# Patient Record
Sex: MALE | Race: WHITE | NOT HISPANIC OR LATINO | Employment: OTHER | URBAN - METROPOLITAN AREA
[De-identification: names, ages, dates, MRNs, and addresses within clinical notes are randomized per-mention and may not be internally consistent; named-entity substitution may affect disease eponyms.]

---

## 2024-08-22 ENCOUNTER — HOSPITAL ENCOUNTER (INPATIENT)
Facility: HOSPITAL | Age: 84
Discharge: HOME HEALTH | DRG: 100 | End: 2024-08-22
Attending: EMERGENCY MEDICINE | Admitting: STUDENT IN AN ORGANIZED HEALTH CARE EDUCATION/TRAINING PROGRAM
Payer: MEDICARE

## 2024-08-22 ENCOUNTER — APPOINTMENT (OUTPATIENT)
Dept: RADIOLOGY | Facility: HOSPITAL | Age: 84
DRG: 100 | End: 2024-08-22
Payer: MEDICARE

## 2024-08-22 ENCOUNTER — APPOINTMENT (OUTPATIENT)
Dept: CARDIOLOGY | Facility: HOSPITAL | Age: 84
DRG: 100 | End: 2024-08-22
Payer: MEDICARE

## 2024-08-22 DIAGNOSIS — G40.909 SEIZURE DISORDER (MULTI): ICD-10-CM

## 2024-08-22 DIAGNOSIS — R41.82 ALTERED MENTAL STATUS, UNSPECIFIED ALTERED MENTAL STATUS TYPE: Primary | ICD-10-CM

## 2024-08-22 DIAGNOSIS — I48.0 PAROXYSMAL ATRIAL FIBRILLATION (MULTI): ICD-10-CM

## 2024-08-22 DIAGNOSIS — E55.9 VITAMIN D DEFICIENCY: ICD-10-CM

## 2024-08-22 DIAGNOSIS — R56.9 SEIZURE (MULTI): ICD-10-CM

## 2024-08-22 DIAGNOSIS — D72.829 LEUKOCYTOSIS, UNSPECIFIED TYPE: ICD-10-CM

## 2024-08-22 DIAGNOSIS — I48.91 ATRIAL FIBRILLATION, UNSPECIFIED TYPE (MULTI): ICD-10-CM

## 2024-08-22 LAB
ALBUMIN SERPL BCP-MCNC: 3.9 G/DL (ref 3.4–5)
ALP SERPL-CCNC: 70 U/L (ref 33–136)
ALT SERPL W P-5'-P-CCNC: 17 U/L (ref 10–52)
AMPHETAMINES UR QL SCN: NORMAL
ANION GAP BLDV CALCULATED.4IONS-SCNC: 17 MMOL/L (ref 10–25)
ANION GAP SERPL CALC-SCNC: 20 MMOL/L (ref 10–20)
APAP SERPL-MCNC: <10 UG/ML
APPEARANCE UR: CLEAR
APTT PPP: 27 SECONDS (ref 27–38)
AST SERPL W P-5'-P-CCNC: 14 U/L (ref 9–39)
BARBITURATES UR QL SCN: NORMAL
BASE EXCESS BLDV CALC-SCNC: -5.9 MMOL/L (ref -2–3)
BASOPHILS # BLD AUTO: 0.07 X10*3/UL (ref 0–0.1)
BASOPHILS # BLD MANUAL: 0 X10*3/UL (ref 0–0.1)
BASOPHILS NFR BLD AUTO: 0.1 %
BASOPHILS NFR BLD MANUAL: 0 %
BENZODIAZ UR QL SCN: NORMAL
BILIRUB SERPL-MCNC: 0.8 MG/DL (ref 0–1.2)
BILIRUB UR STRIP.AUTO-MCNC: NEGATIVE MG/DL
BODY TEMPERATURE: ABNORMAL
BUN SERPL-MCNC: 23 MG/DL (ref 6–23)
BZE UR QL SCN: NORMAL
CA-I BLDV-SCNC: 1.26 MMOL/L (ref 1.1–1.33)
CALCIUM SERPL-MCNC: 9.2 MG/DL (ref 8.6–10.3)
CANNABINOIDS UR QL SCN: NORMAL
CARDIAC TROPONIN I PNL SERPL HS: 5 NG/L (ref 0–20)
CHLORIDE BLDV-SCNC: 102 MMOL/L (ref 98–107)
CHLORIDE SERPL-SCNC: 102 MMOL/L (ref 98–107)
CO2 SERPL-SCNC: 19 MMOL/L (ref 21–32)
COLOR UR: ABNORMAL
CREAT SERPL-MCNC: 1.38 MG/DL (ref 0.5–1.3)
CRP SERPL-MCNC: 0.82 MG/DL
EGFRCR SERPLBLD CKD-EPI 2021: 50 ML/MIN/1.73M*2
EOSINOPHIL # BLD AUTO: 0.22 X10*3/UL (ref 0–0.4)
EOSINOPHIL # BLD MANUAL: 0 X10*3/UL (ref 0–0.4)
EOSINOPHIL NFR BLD AUTO: 0.4 %
EOSINOPHIL NFR BLD MANUAL: 0 %
ERYTHROCYTE [DISTWIDTH] IN BLOOD BY AUTOMATED COUNT: 13.7 % (ref 11.5–14.5)
ERYTHROCYTE [DISTWIDTH] IN BLOOD BY AUTOMATED COUNT: 13.8 % (ref 11.5–14.5)
ERYTHROCYTE [SEDIMENTATION RATE] IN BLOOD BY WESTERGREN METHOD: 3 MM/H (ref 0–20)
ETHANOL SERPL-MCNC: <10 MG/DL
FENTANYL+NORFENTANYL UR QL SCN: NORMAL
GLUCOSE BLD MANUAL STRIP-MCNC: 178 MG/DL (ref 74–99)
GLUCOSE BLDV-MCNC: 200 MG/DL (ref 74–99)
GLUCOSE SERPL-MCNC: 193 MG/DL (ref 74–99)
GLUCOSE UR STRIP.AUTO-MCNC: ABNORMAL MG/DL
HCO3 BLDV-SCNC: 20.2 MMOL/L (ref 22–26)
HCT VFR BLD AUTO: 43.3 % (ref 41–52)
HCT VFR BLD AUTO: 43.6 % (ref 41–52)
HCT VFR BLD EST: 42 % (ref 41–52)
HGB BLD-MCNC: 13.9 G/DL (ref 13.5–17.5)
HGB BLD-MCNC: 14.1 G/DL (ref 13.5–17.5)
HGB BLDV-MCNC: 13.9 G/DL (ref 13.5–17.5)
HOLD SPECIMEN: NORMAL
IMM GRANULOCYTES # BLD AUTO: 0.21 X10*3/UL (ref 0–0.5)
IMM GRANULOCYTES # BLD AUTO: 0.22 X10*3/UL (ref 0–0.5)
IMM GRANULOCYTES NFR BLD AUTO: 0.4 % (ref 0–0.9)
IMM GRANULOCYTES NFR BLD AUTO: 0.5 % (ref 0–0.9)
INHALED O2 CONCENTRATION: 0 %
INR PPP: 1.1 (ref 0.9–1.1)
KETONES UR STRIP.AUTO-MCNC: ABNORMAL MG/DL
LACTATE BLDV-SCNC: 8 MMOL/L (ref 0.4–2)
LACTATE SERPL-SCNC: 3.1 MMOL/L (ref 0.4–2)
LEUKOCYTE ESTERASE UR QL STRIP.AUTO: NEGATIVE
LYMPHOCYTES # BLD AUTO: 49.89 X10*3/UL (ref 0.8–3)
LYMPHOCYTES # BLD MANUAL: 47.97 X10*3/UL (ref 0.8–3)
LYMPHOCYTES NFR BLD AUTO: 87 %
LYMPHOCYTES NFR BLD MANUAL: 78 %
MCH RBC QN AUTO: 29.6 PG (ref 26–34)
MCH RBC QN AUTO: 29.7 PG (ref 26–34)
MCHC RBC AUTO-ENTMCNC: 32.1 G/DL (ref 32–36)
MCHC RBC AUTO-ENTMCNC: 32.3 G/DL (ref 32–36)
MCV RBC AUTO: 92 FL (ref 80–100)
MCV RBC AUTO: 92 FL (ref 80–100)
METHADONE UR QL SCN: NORMAL
MONOCYTES # BLD AUTO: 0.28 X10*3/UL (ref 0.05–0.8)
MONOCYTES # BLD MANUAL: 0.62 X10*3/UL (ref 0.05–0.8)
MONOCYTES NFR BLD AUTO: 0.5 %
MONOCYTES NFR BLD MANUAL: 1 %
MUCOUS THREADS #/AREA URNS AUTO: NORMAL /LPF
NEUTROPHILS # BLD AUTO: 6.67 X10*3/UL (ref 1.6–5.5)
NEUTROPHILS # BLD MANUAL: 12.92 X10*3/UL (ref 1.6–5.5)
NEUTROPHILS NFR BLD AUTO: 11.6 %
NEUTS BAND # BLD MANUAL: 1.85 X10*3/UL (ref 0–0.5)
NEUTS BAND NFR BLD MANUAL: 3 %
NEUTS SEG # BLD MANUAL: 11.07 X10*3/UL (ref 1.6–5)
NEUTS SEG NFR BLD MANUAL: 18 %
NITRITE UR QL STRIP.AUTO: NEGATIVE
NRBC BLD-RTO: 0 /100 WBCS (ref 0–0)
NRBC BLD-RTO: 0 /100 WBCS (ref 0–0)
OPIATES UR QL SCN: NORMAL
OXYCODONE+OXYMORPHONE UR QL SCN: NORMAL
OXYHGB MFR BLDV: 91.5 % (ref 45–75)
PCO2 BLDV: 41 MM HG (ref 41–51)
PCP UR QL SCN: NORMAL
PH BLDV: 7.3 PH (ref 7.33–7.43)
PH UR STRIP.AUTO: 5.5 [PH]
PLATELET # BLD AUTO: 141 X10*3/UL (ref 150–450)
PLATELET # BLD AUTO: 161 X10*3/UL (ref 150–450)
PO2 BLDV: 71 MM HG (ref 35–45)
POTASSIUM BLDV-SCNC: 3.8 MMOL/L (ref 3.5–5.3)
POTASSIUM SERPL-SCNC: 3.8 MMOL/L (ref 3.5–5.3)
PROT SERPL-MCNC: 6.4 G/DL (ref 6.4–8.2)
PROT UR STRIP.AUTO-MCNC: ABNORMAL MG/DL
PROTHROMBIN TIME: 12.4 SECONDS (ref 9.8–12.8)
RBC # BLD AUTO: 4.69 X10*6/UL (ref 4.5–5.9)
RBC # BLD AUTO: 4.74 X10*6/UL (ref 4.5–5.9)
RBC # UR STRIP.AUTO: NEGATIVE /UL
RBC #/AREA URNS AUTO: NORMAL /HPF
RBC MORPH BLD: ABNORMAL
RBC MORPH BLD: NORMAL
SALICYLATES SERPL-MCNC: <3 MG/DL
SAO2 % BLDV: 94 % (ref 45–75)
SODIUM BLDV-SCNC: 135 MMOL/L (ref 136–145)
SODIUM SERPL-SCNC: 137 MMOL/L (ref 136–145)
SP GR UR STRIP.AUTO: 1.04
TOTAL CELLS COUNTED BLD: 100
UROBILINOGEN UR STRIP.AUTO-MCNC: NORMAL MG/DL
WBC # BLD AUTO: 57.4 X10*3/UL (ref 4.4–11.3)
WBC # BLD AUTO: 61.5 X10*3/UL (ref 4.4–11.3)
WBC #/AREA URNS AUTO: NORMAL /HPF

## 2024-08-22 PROCEDURE — 82435 ASSAY OF BLOOD CHLORIDE: CPT

## 2024-08-22 PROCEDURE — 80177 DRUG SCRN QUAN LEVETIRACETAM: CPT | Mod: GEALAB

## 2024-08-22 PROCEDURE — 2500000001 HC RX 250 WO HCPCS SELF ADMINISTERED DRUGS (ALT 637 FOR MEDICARE OP)

## 2024-08-22 PROCEDURE — 70498 CT ANGIOGRAPHY NECK: CPT

## 2024-08-22 PROCEDURE — 2500000004 HC RX 250 GENERAL PHARMACY W/ HCPCS (ALT 636 FOR OP/ED)

## 2024-08-22 PROCEDURE — 1200000002 HC GENERAL ROOM WITH TELEMETRY DAILY

## 2024-08-22 PROCEDURE — 36415 COLL VENOUS BLD VENIPUNCTURE: CPT

## 2024-08-22 PROCEDURE — 70450 CT HEAD/BRAIN W/O DYE: CPT

## 2024-08-22 PROCEDURE — 85610 PROTHROMBIN TIME: CPT

## 2024-08-22 PROCEDURE — 82947 ASSAY GLUCOSE BLOOD QUANT: CPT

## 2024-08-22 PROCEDURE — 72131 CT LUMBAR SPINE W/O DYE: CPT

## 2024-08-22 PROCEDURE — 83605 ASSAY OF LACTIC ACID: CPT | Performed by: EMERGENCY MEDICINE

## 2024-08-22 PROCEDURE — 99285 EMERGENCY DEPT VISIT HI MDM: CPT | Mod: 25

## 2024-08-22 PROCEDURE — 86140 C-REACTIVE PROTEIN: CPT

## 2024-08-22 PROCEDURE — 80179 DRUG ASSAY SALICYLATE: CPT

## 2024-08-22 PROCEDURE — 70498 CT ANGIOGRAPHY NECK: CPT | Performed by: STUDENT IN AN ORGANIZED HEALTH CARE EDUCATION/TRAINING PROGRAM

## 2024-08-22 PROCEDURE — 96365 THER/PROPH/DIAG IV INF INIT: CPT

## 2024-08-22 PROCEDURE — 87040 BLOOD CULTURE FOR BACTERIA: CPT | Mod: GEALAB

## 2024-08-22 PROCEDURE — 84145 PROCALCITONIN (PCT): CPT | Mod: GEALAB

## 2024-08-22 PROCEDURE — 72131 CT LUMBAR SPINE W/O DYE: CPT | Performed by: STUDENT IN AN ORGANIZED HEALTH CARE EDUCATION/TRAINING PROGRAM

## 2024-08-22 PROCEDURE — 87799 DETECT AGENT NOS DNA QUANT: CPT | Mod: GEALAB

## 2024-08-22 PROCEDURE — 74176 CT ABD & PELVIS W/O CONTRAST: CPT

## 2024-08-22 PROCEDURE — 96366 THER/PROPH/DIAG IV INF ADDON: CPT

## 2024-08-22 PROCEDURE — 85027 COMPLETE CBC AUTOMATED: CPT

## 2024-08-22 PROCEDURE — 85025 COMPLETE CBC W/AUTO DIFF WBC: CPT

## 2024-08-22 PROCEDURE — 71045 X-RAY EXAM CHEST 1 VIEW: CPT

## 2024-08-22 PROCEDURE — 96361 HYDRATE IV INFUSION ADD-ON: CPT

## 2024-08-22 PROCEDURE — 86644 CMV ANTIBODY: CPT | Mod: GEALAB

## 2024-08-22 PROCEDURE — 86645 CMV ANTIBODY IGM: CPT

## 2024-08-22 PROCEDURE — 2550000001 HC RX 255 CONTRASTS: Performed by: EMERGENCY MEDICINE

## 2024-08-22 PROCEDURE — 93005 ELECTROCARDIOGRAM TRACING: CPT

## 2024-08-22 PROCEDURE — 70496 CT ANGIOGRAPHY HEAD: CPT | Performed by: STUDENT IN AN ORGANIZED HEALTH CARE EDUCATION/TRAINING PROGRAM

## 2024-08-22 PROCEDURE — 71045 X-RAY EXAM CHEST 1 VIEW: CPT | Performed by: RADIOLOGY

## 2024-08-22 PROCEDURE — 84484 ASSAY OF TROPONIN QUANT: CPT

## 2024-08-22 PROCEDURE — 85652 RBC SED RATE AUTOMATED: CPT

## 2024-08-22 PROCEDURE — 74176 CT ABD & PELVIS W/O CONTRAST: CPT | Performed by: STUDENT IN AN ORGANIZED HEALTH CARE EDUCATION/TRAINING PROGRAM

## 2024-08-22 PROCEDURE — 86788 WEST NILE VIRUS AB IGM: CPT

## 2024-08-22 PROCEDURE — 85007 BL SMEAR W/DIFF WBC COUNT: CPT

## 2024-08-22 PROCEDURE — 86780 TREPONEMA PALLIDUM: CPT | Mod: GEALAB

## 2024-08-22 PROCEDURE — 84075 ASSAY ALKALINE PHOSPHATASE: CPT

## 2024-08-22 PROCEDURE — 81001 URINALYSIS AUTO W/SCOPE: CPT

## 2024-08-22 PROCEDURE — 87476 LYME DIS DNA AMP PROBE: CPT

## 2024-08-22 PROCEDURE — 80307 DRUG TEST PRSMV CHEM ANLYZR: CPT

## 2024-08-22 PROCEDURE — 85730 THROMBOPLASTIN TIME PARTIAL: CPT

## 2024-08-22 PROCEDURE — 70450 CT HEAD/BRAIN W/O DYE: CPT | Performed by: STUDENT IN AN ORGANIZED HEALTH CARE EDUCATION/TRAINING PROGRAM

## 2024-08-22 RX ORDER — ACETAMINOPHEN 650 MG/1
650 SUPPOSITORY RECTAL EVERY 4 HOURS PRN
Status: DISCONTINUED | OUTPATIENT
Start: 2024-08-22 | End: 2024-08-26 | Stop reason: HOSPADM

## 2024-08-22 RX ORDER — POTASSIUM &MAGNESIUM ASPARTATE 250-250 MG
500 CAPSULE ORAL DAILY
COMMUNITY

## 2024-08-22 RX ORDER — LEVETIRACETAM 10 MG/ML
1000 INJECTION INTRAVASCULAR ONCE
Status: COMPLETED | OUTPATIENT
Start: 2024-08-22 | End: 2024-08-22

## 2024-08-22 RX ORDER — METOPROLOL TARTRATE 25 MG/1
25 TABLET, FILM COATED ORAL DAILY
COMMUNITY
Start: 2024-05-19

## 2024-08-22 RX ORDER — ONDANSETRON HYDROCHLORIDE 2 MG/ML
4 INJECTION, SOLUTION INTRAVENOUS EVERY 8 HOURS PRN
Status: DISCONTINUED | OUTPATIENT
Start: 2024-08-22 | End: 2024-08-26 | Stop reason: HOSPADM

## 2024-08-22 RX ORDER — ACETAMINOPHEN 500 MG
5000 TABLET ORAL DAILY
COMMUNITY

## 2024-08-22 RX ORDER — ONDANSETRON 4 MG/1
4 TABLET, FILM COATED ORAL EVERY 8 HOURS PRN
Status: DISCONTINUED | OUTPATIENT
Start: 2024-08-22 | End: 2024-08-26 | Stop reason: HOSPADM

## 2024-08-22 RX ORDER — ACETAMINOPHEN 160 MG/5ML
650 SOLUTION ORAL EVERY 4 HOURS PRN
Status: DISCONTINUED | OUTPATIENT
Start: 2024-08-22 | End: 2024-08-26 | Stop reason: HOSPADM

## 2024-08-22 RX ORDER — WARFARIN 2 MG/1
0.5 TABLET ORAL DAILY
Status: ON HOLD | COMMUNITY
Start: 2024-05-01 | End: 2024-08-23 | Stop reason: ENTERED-IN-ERROR

## 2024-08-22 RX ORDER — VITAMIN B COMPLEX
1 TABLET ORAL DAILY
COMMUNITY

## 2024-08-22 RX ORDER — CITALOPRAM 20 MG/1
1 TABLET, FILM COATED ORAL
COMMUNITY
Start: 2024-05-19

## 2024-08-22 RX ORDER — AMANTADINE HYDROCHLORIDE 100 MG/1
1 CAPSULE, GELATIN COATED ORAL
COMMUNITY
Start: 2024-05-21

## 2024-08-22 RX ORDER — LEVETIRACETAM 500 MG/1
1 TABLET ORAL
Status: ON HOLD | COMMUNITY
Start: 2024-05-19 | End: 2024-08-23

## 2024-08-22 RX ORDER — ACETAMINOPHEN 500 MG
5 TABLET ORAL NIGHTLY
Status: DISCONTINUED | OUTPATIENT
Start: 2024-08-22 | End: 2024-08-26 | Stop reason: HOSPADM

## 2024-08-22 RX ORDER — ACETAMINOPHEN 325 MG/1
650 TABLET ORAL EVERY 4 HOURS PRN
Status: DISCONTINUED | OUTPATIENT
Start: 2024-08-22 | End: 2024-08-26 | Stop reason: HOSPADM

## 2024-08-22 RX ORDER — WARFARIN 6 MG/1
6 TABLET ORAL DAILY
COMMUNITY
Start: 2024-04-10

## 2024-08-22 RX ORDER — OLANZAPINE 10 MG/2ML
2.5 INJECTION, POWDER, FOR SOLUTION INTRAMUSCULAR ONCE
Status: COMPLETED | OUTPATIENT
Start: 2024-08-22 | End: 2024-08-22

## 2024-08-22 RX ORDER — POLYETHYLENE GLYCOL 3350 17 G/17G
17 POWDER, FOR SOLUTION ORAL DAILY
Status: DISCONTINUED | OUTPATIENT
Start: 2024-08-22 | End: 2024-08-26

## 2024-08-22 RX ORDER — PHENYLEPHRINE HCL 10 MG
500 TABLET ORAL DAILY
COMMUNITY

## 2024-08-22 SDOH — SOCIAL STABILITY: SOCIAL INSECURITY: DO YOU FEEL UNSAFE GOING BACK TO THE PLACE WHERE YOU ARE LIVING?: UNABLE TO ASSESS

## 2024-08-22 SDOH — SOCIAL STABILITY: SOCIAL INSECURITY: DO YOU FEEL ANYONE HAS EXPLOITED OR TAKEN ADVANTAGE OF YOU FINANCIALLY OR OF YOUR PERSONAL PROPERTY?: UNABLE TO ASSESS

## 2024-08-22 SDOH — SOCIAL STABILITY: SOCIAL INSECURITY: HAVE YOU HAD ANY THOUGHTS OF HARMING ANYONE ELSE?: UNABLE TO ASSESS

## 2024-08-22 SDOH — SOCIAL STABILITY: SOCIAL INSECURITY: WERE YOU ABLE TO COMPLETE ALL THE BEHAVIORAL HEALTH SCREENINGS?: YES

## 2024-08-22 SDOH — SOCIAL STABILITY: SOCIAL INSECURITY: HAVE YOU HAD THOUGHTS OF HARMING ANYONE ELSE?: UNABLE TO ASSESS

## 2024-08-22 SDOH — SOCIAL STABILITY: SOCIAL INSECURITY: ARE THERE ANY APPARENT SIGNS OF INJURIES/BEHAVIORS THAT COULD BE RELATED TO ABUSE/NEGLECT?: UNABLE TO ASSESS

## 2024-08-22 SDOH — SOCIAL STABILITY: SOCIAL INSECURITY: DOES ANYONE TRY TO KEEP YOU FROM HAVING/CONTACTING OTHER FRIENDS OR DOING THINGS OUTSIDE YOUR HOME?: UNABLE TO ASSESS

## 2024-08-22 SDOH — SOCIAL STABILITY: SOCIAL INSECURITY: HAS ANYONE EVER THREATENED TO HURT YOUR FAMILY OR YOUR PETS?: UNABLE TO ASSESS

## 2024-08-22 SDOH — SOCIAL STABILITY: SOCIAL INSECURITY: ARE YOU OR HAVE YOU BEEN THREATENED OR ABUSED PHYSICALLY, EMOTIONALLY, OR SEXUALLY BY ANYONE?: UNABLE TO ASSESS

## 2024-08-22 SDOH — SOCIAL STABILITY: SOCIAL INSECURITY: ABUSE: ADULT

## 2024-08-22 ASSESSMENT — COGNITIVE AND FUNCTIONAL STATUS - GENERAL
STANDING UP FROM CHAIR USING ARMS: A LOT
DRESSING REGULAR UPPER BODY CLOTHING: A LOT
PERSONAL GROOMING: A LOT
TURNING FROM BACK TO SIDE WHILE IN FLAT BAD: A LOT
WALKING IN HOSPITAL ROOM: TOTAL
DRESSING REGULAR LOWER BODY CLOTHING: A LOT
DAILY ACTIVITIY SCORE: 12
MOBILITY SCORE: 11
PATIENT BASELINE BEDBOUND: UNABLE TO ASSESS AT THIS TIME
MOVING FROM LYING ON BACK TO SITTING ON SIDE OF FLAT BED WITH BEDRAILS: A LITTLE
CLIMB 3 TO 5 STEPS WITH RAILING: TOTAL
EATING MEALS: A LOT
HELP NEEDED FOR BATHING: A LOT
MOVING TO AND FROM BED TO CHAIR: A LOT
TOILETING: A LOT

## 2024-08-22 ASSESSMENT — ENCOUNTER SYMPTOMS
NUMBNESS: 0
MYALGIAS: 0
DIARRHEA: 0
HEMATURIA: 0
UNEXPECTED WEIGHT CHANGE: 0
COUGH: 0
PALPITATIONS: 0
WEAKNESS: 0
CONSTIPATION: 0
SEIZURES: 1
DIZZINESS: 0
NAUSEA: 0
FREQUENCY: 0
SHORTNESS OF BREATH: 0
DIFFICULTY URINATING: 0
ABDOMINAL DISTENTION: 0
FLANK PAIN: 0
HEADACHES: 0
WHEEZING: 0
DIAPHORESIS: 1
FEVER: 1
LIGHT-HEADEDNESS: 0
CHEST TIGHTNESS: 0
DYSURIA: 0
VOMITING: 0

## 2024-08-22 ASSESSMENT — ACTIVITIES OF DAILY LIVING (ADL)
HEARING - LEFT EAR: FUNCTIONAL
JUDGMENT_ADEQUATE_SAFELY_COMPLETE_DAILY_ACTIVITIES: NO
PATIENT'S MEMORY ADEQUATE TO SAFELY COMPLETE DAILY ACTIVITIES?: NO
WALKS IN HOME: DEPENDENT
ADEQUATE_TO_COMPLETE_ADL: YES
GROOMING: DEPENDENT
FEEDING YOURSELF: NEEDS ASSISTANCE
HEARING - RIGHT EAR: FUNCTIONAL
LACK_OF_TRANSPORTATION: NO
DRESSING YOURSELF: DEPENDENT
ASSISTIVE_DEVICE: WHEELCHAIR
TOILETING: DEPENDENT
BATHING: DEPENDENT

## 2024-08-22 ASSESSMENT — PAIN SCALES - PAIN ASSESSMENT IN ADVANCED DEMENTIA (PAINAD)
CONSOLABILITY: NO NEED TO CONSOLE
BREATHING: NORMAL
TOTALSCORE: 0
FACIALEXPRESSION: SMILING OR INEXPRESSIVE
BODYLANGUAGE: RELAXED

## 2024-08-22 ASSESSMENT — COLUMBIA-SUICIDE SEVERITY RATING SCALE - C-SSRS
2. HAVE YOU ACTUALLY HAD ANY THOUGHTS OF KILLING YOURSELF?: NO
1. IN THE PAST MONTH, HAVE YOU WISHED YOU WERE DEAD OR WISHED YOU COULD GO TO SLEEP AND NOT WAKE UP?: NO
6. HAVE YOU EVER DONE ANYTHING, STARTED TO DO ANYTHING, OR PREPARED TO DO ANYTHING TO END YOUR LIFE?: NO

## 2024-08-22 ASSESSMENT — LIFESTYLE VARIABLES
AUDIT-C TOTAL SCORE: 0
HOW MANY STANDARD DRINKS CONTAINING ALCOHOL DO YOU HAVE ON A TYPICAL DAY: PATIENT DOES NOT DRINK
AUDIT-C TOTAL SCORE: 0
SKIP TO QUESTIONS 9-10: 1
HOW OFTEN DO YOU HAVE A DRINK CONTAINING ALCOHOL: NEVER
HOW OFTEN DO YOU HAVE 6 OR MORE DRINKS ON ONE OCCASION: NEVER

## 2024-08-22 ASSESSMENT — PAIN - FUNCTIONAL ASSESSMENT
PAIN_FUNCTIONAL_ASSESSMENT: PAINAD (PAIN ASSESSMENT IN ADVANCED DEMENTIA SCALE)
PAIN_FUNCTIONAL_ASSESSMENT: UNABLE TO SELF-REPORT

## 2024-08-22 ASSESSMENT — PATIENT HEALTH QUESTIONNAIRE - PHQ9
2. FEELING DOWN, DEPRESSED OR HOPELESS: NOT AT ALL
1. LITTLE INTEREST OR PLEASURE IN DOING THINGS: NOT AT ALL
SUM OF ALL RESPONSES TO PHQ9 QUESTIONS 1 & 2: 0

## 2024-08-22 NOTE — ED TRIAGE NOTES
Pt presents via [facility] from home with family. Per EMS pt's family stated that pt was sitting that the table eating when he had 2 episodes of shaking that lasted aprox 10 seconds.

## 2024-08-22 NOTE — PROGRESS NOTES
Pharmacy Medication History Review    Arturo Dale is a 84 y.o. male admitted for Altered mental status, unspecified altered mental status type. Pharmacy reviewed the patient's [id number] medications and allergies for accuracy.    The list below reflectives the updated PTA list. Please review each medication in order reconciliation for additional clarification and justification.  Prior to Admission Medications   Prescriptions Last Dose Informant Patient Reported? Taking?   Cinnamon 500 mg capsule Unknown Family Member, Other Yes See Med Rec      Sig: Take 1 capsule (500 mg) by mouth.   UNABLE TO FIND Unknown Family Member, Other Yes See Med Rec      Sig: Take by mouth. Med Name: Vivarin Caffeine Alertness Aid   amantadine (Symmetrel) 100 mg capsule Unknown Family Member, Other Yes See Med Rec      Sig: Take 1 capsule (100 mg) by mouth every 12 hours.   cholecalciferol (Vitamin D-3) 5,000 Units tablet Unknown Family Member, Other Yes See Med Rec      Sig: Take 1 tablet (5,000 Units) by mouth.   citalopram (CeleXA) 20 mg tablet Unknown Family Member, Other Yes See Med Rec      Sig: Take 1 tablet (20 mg) by mouth early in the morning..   cranberry 500 mg capsule Unknown Family Member, Other Yes See Med Rec      Sig: Take 500 mg by mouth.   cyanocobalamin, vitamin B-12, (Vitamin B-12) 2,500 mcg tablet, sublingual SL tablet Unknown Family Member, Other Yes See Med Rec      Sig: Place 1 tablet (2,500 mcg) under the tongue.   levETIRAcetam (Keppra) 500 mg tablet Unknown Family Member, Other Yes See Med Rec      Sig: Take 1 tablet (500 mg) by mouth every 12 hours.   metoprolol tartrate (Lopressor) 25 mg tablet Unknown Family Member, Other Yes See Med Rec      Sig: Take 1 tablet (25 mg) by mouth once daily. Take with food.   warfarin (Coumadin) 2 mg tablet Unknown Family Member, Other Yes See Med Rec      Sig: Take 0.5 tablets (1 mg) by mouth once daily.   warfarin (Coumadin) 5 mg tablet Unknown Family Member,  Other Yes See Med Rec   Sig: Take 1 tablet (5 mg) by mouth early in the morning..      Facility-Administered Medications: None           The list below reflectives the updated allergy list. Please review each documented allergy for additional clarification and justification.  Allergies  Indicated as Unable to Assess by Marla Hernandez RN on 8/22/2024 (Patient unable to communicate)   Not on File         Below are additional concerns with the patient's PTA list.  Unsure when last doses were.  Sister states his spouse is in hospital and he's been off schedule with medications.  Sister brought in medication bottles.    Neha Moraes

## 2024-08-22 NOTE — PROGRESS NOTES
08/22/24 1522   Discharge Planning   Living Arrangements Spouse/significant other;Family members  (Lives in [place] with spouse. Currently staying with sister in law at [address] , [facility] [address])   Support Systems Spouse/significant other;Family members   Assistance Needed A&OX1 (knows name but location and year answered 72-sister in law bedside stated this is patient's baseline); dependent on assist for ADLs and uses wheelchair-can transfer self at times but mostly assist x 1-2 to chair; doesn't drive; room air baseline -currently 2L NC   Type of Residence Private residence   Number of Stairs to Enter Residence 0  (sister in laws home)   Number of Stairs Within Residence 0  (sister in laws home)   Do you have animals or pets at home? Yes   Type of Animals or Pets 2 dogs & 1 cat   Who is requesting discharge planning? Provider   Home or Post Acute Services None   Expected Discharge Disposition Home  (Pt wheelchair bound and likely dc home to sister in laws home in [place] no needs)   Does the patient need discharge transport arranged? Yes   RoundTrip coordination needed? Yes   Has discharge transport been arranged? No   Financial Resource Strain   How hard is it for you to pay for the very basics like food, housing, medical care, and heating? Not hard   Housing Stability   In the last 12 months, was there a time when you were not able to pay the mortgage or rent on time? N   In the past 12 months, how many times have you moved where you were living? 1   At any time in the past 12 months, were you homeless or living in a shelter (including now)? N   Transportation Needs   In the past 12 months, has lack of transportation kept you from medical appointments or from getting medications? no   In the past 12 months, has lack of transportation kept you from meetings, work, or from getting things needed for daily living? No     08/22/2024 1526pm  Spoke with patient and patient's sister in law bedside in ED. Patient  spouse currently admitted to [address] [place] [address].

## 2024-08-22 NOTE — ED PROVIDER NOTES
History/Exam limitations: mental status.   Additional history was obtained from spouse/SO and EMS personnel.    HPI:    Patient is a 84-year-old male with history of 2 strokes (not ambulatory at baseline but able to bear weight and change positions), on Coumadin, history of seizures on Keppra, hypertension presenting due to concerns of altered mental status and possible seizure at home.  He had a elevated lactate by EMS that was 10.  Patient's sister-in-law was eating breakfast with him and noticed that he started leaning backwards.  He then had a witnessed episode of tonic-clonic movement without fall or trauma.  They then called EMS.  They were told to lay the patient on the floor and were able to do so. Report, patient has been improving in his mentation and now moving all extremities.  He was initially only had sonorous breathing without any spontaneous movement of his extremities.  He has an initial GCS of 9 and withdrawing to pain, spontaneous eye movement that is tracking without any verbal response.  Patient has stable vitals and is  on 2 L of supplemental oxygen.  patient appears to have dried emesis on his lower beard.  He does have a left-sided facial droop.  He is unable to partake in most of the neurologic exam.  Patient was emergently alerted as a stroke and taken to CT.  Glucose initially was 178.      Last Known Well Time: 11:47AM        ------------------------------------------------------------------------------------------------------------------------------------------     Physical Exam:    ED Triage Vitals   Temp Pulse Resp BP   -- -- -- --      SpO2 Temp src Heart Rate Source Patient Position   -- -- -- --      BP Location FiO2 (%)     -- --          Gen: Not in acute distress  Head/Neck: NCAT  Eyes: Anicteric sclerae, noninjected conjunctivae  Nose: No rhinorrhea  Mouth:  MMM  Heart: Regular rate and rhythm w/ no murmurs, rubs, gallops  Lungs: CTAB w/o wheezes, rales, rhonchi, no increased  work of breathing  Abdomen: Soft, NT/ND  Musculoskeletal: No deformities  Extremities: No edema.  Neurologic: Please see below for NIHSS  Skin: No rashes noted  Psychological: Calm        Interval: Baseline  Time: 1:11 PM  Person Administering Scale: Debra Woodruff MD     1a  Level of consciousness: 0=alert; keenly responsive   1b. LOC questions:  2=Performs neither task correctly   1c. LOC commands: 2=Performs neither task correctly   2.  Best Gaze: 0=normal   3. Visual: 0=No visual loss   4. Facial Palsy: 1=Minor paralysis (flattened nasolabial fold, asymmetric on smiling)   5a. Motor left arm: 0=No drift, limb holds 90 (or 45) degrees for full 10 seconds (could not follow commands)   5b.  Motor right arm: 0=No drift, limb holds 90 (or 45) degrees for full 10 seconds (could not follow commands)   6a. motor left le=No drift, limb holds 90 (or 45) degrees for full 10 seconds (could not follow commands)   6b  Motor right le=No drift, limb holds 90 (or 45) degrees for full 10 seconds (could not follow commands)   7. Limb Ataxia: 0=Absent (could not follow commands)   8.  Sensory: 0=Normal; no sensory loss   9. Best Language:  3=Mute, global aphasia; no usable speech or auditory comprehension   10. Dysarthria: 0=Normal   11. Extinction and Inattention: 0=No abnormality (could not follow commands)     Total:   8         VAN: VAN: Positive     ------------------------------------------------------------------------------------------------------------------------------------------     Medical Decision Making:   Patient is a 84-year-old male with history of 2 strokes (not ambulatory at baseline but able to bear weight and change positions), on Coumadin, history of seizures on Keppra, hypertension presenting due to concerns of altered mental status and possible seizure at home.  Differentials considered but not limited to UTI, seizure, medication noncompliance, new stroke, bleed, ACS, pneumonia,  infection.    Based on the patient's history and physical examination broad workup was initiated.  Unknown patient's baseline kidney function however he does have a slight ALDO at 1.38.  Patient has significant demargination with a resultant leukocytosis that is consistent with his reported seizure. Patient's lab work shows elevated leukocytosis to 54 with a lymphocyte and not neutrophil predominance.  Predominance repeat lab work was obtained after fluids and CBC to evaluate if leukocytosis was consistent.  Patient continued to have elevated leukocytosis.  He might be hypercoagulable from underlying undiagnosed leukemia.  Patient was admitted for further workup.  Patient care was overseen by attending physician agrees with plan and disposition.    ED Course as of 08/24/24 1125   Thu Aug 22, 2024   1357 Patient loaded with keppra and is back at baseline [RR]      ED Course User Index  [RR] Debra Woodruff MD         Diagnoses as of 08/24/24 1125   Altered mental status, unspecified altered mental status type   Seizure (Multi)   Leukocytosis, unspecified type        EKG interpreted by myself: Sinus rhythm with a rate of 82.  Right bundle branch block that any acute ST elevations.  T wave inversions noted in lead III.  Noisy baseline.  QTc 495.  No NEREIDA.     Independent Interpretation of Studies: I independently interpreted the CT head and see No obvious evidence of intracranial hemorrhage and No obvious evidence of skull fracture    Chronic Medical Conditions Significantly Affecting Care:  2 strokes (not ambulatory at baseline but able to bear weight and change positions), on Coumadin, history of seizures on Keppra, hypertension    Social Determinants of Health Significantly Affecting Care: Chronic illness in caregivers     External Records Reviewed: I reviewed recent and relevant outside records including: prior records     Discussion of Management with Other Providers:   I discussed the patient/results with:  attending, neurology and the admitting team      IV Thrombolysis IV Thrombolysis Checklist        IV Thrombolysis Given: No; Thrombolysis contraindication reason: Coagulopathy with Platelets <100,000/mm3 OR aPTT >40s OR PT>15s OR INR>1.7          Procedure  Procedures          Debra Woodruff MD  Resident  08/24/24 7812

## 2024-08-22 NOTE — H&P
Internal Medicine - History and Physical Note      Patient: Arturo Dale, Age: 84 y.o., SEX: male , MRN:[id number], ROOM:119/[facility],  Code: Full Code   Admitted On: 8/22/2024   Admitting Dx: Seizure (Multi) [R56.9]  Altered mental status, unspecified altered mental status type [R41.82]  Leukocytosis, unspecified type [D72.829]  PCP: No Assigned PCP Generic MD Angelika        Attending: Mateo August MD        Chief Complaint   Patient: Arturo Dale is a 84 y.o. male who presented to the hospital for   Chief Complaint   Patient presents with    Altered Mental Status       HPI   Arturo Dale is a 84 y.o. year old male  patient with history of 2 strokes, most recent in January 2024, seizure disorder, vascular dementia, COPD, and HTN presenting for seizure. Patient is non-verbal, originally from [place]. His sister-in-law is present on evaluation. Unfortunately his sister-in-law was not aware of much of the patient's history, however she witnessed the seizure occurring this morning after the patient had finished his breakfast. She described his movements as upper and lower extremity jerking motions that lasted for over 5 minutes. She had contacted the paramedics and had laid the patient on the ground. The patient did not fall or injure himself. The sister-in-law reports that he may have had a fever when she went to place him as his forehead was very hot. According to EMS, the patient had a lactate of 10 prior to arrival in the ED. Otherwise the family member does not believe the patient was having any other symptoms including shortness of breath, chest pain, dysuria, nausea, vomiting, abdominal pain, or diarrhea.    ROS  Review of Systems   Constitutional:  Positive for diaphoresis and fever. Negative for unexpected weight change.   Respiratory:  Negative for cough, chest tightness, shortness of breath and wheezing.    Cardiovascular:  Negative for chest pain, palpitations and leg swelling.    Gastrointestinal:  Negative for abdominal distention, constipation, diarrhea, nausea and vomiting.   Genitourinary:  Negative for difficulty urinating, dysuria, flank pain, frequency and hematuria.   Musculoskeletal:  Negative for myalgias.   Neurological:  Positive for seizures. Negative for dizziness, weakness, light-headedness, numbness and headaches.        12 Point ROS negative unless otherwise specified above.     ED COURSE:   VS: /82 , heart rate 83 , respiration 16 , O2 sat 90%, no ED temperature recorded    Labs  - CBC: WBC 57.4 (49.89 Lymphocytes) Hemoglobin 14.1, Plt 161  - BMP:Cr 1.38, BUN 23, Na 137, K 3.8, Glucose 193  - LFTs: ALP 70, ALT 17, AST 14  - Lactate: 3.1  - VBG: pH 7.3, pCO2 41, pO2 71  - UA positive for: trace ketones and glucose  - Blood cultures: Ordered  - Urine cultures: Ordered    Imaging:    CXR: No acute cardiopulmonary process    CT Brain attack head:   1. No acute intracranial hemorrhage or serious brain herniation  2. Right frontal and left temporoparietal CSF density  areas/encephalomalacia likely sequelae of prior infarcts.  3. Moderate cerebral volume loss as well as sequelae of chronic  microvascular ischemia    CT Brain Attack Angio  No hemodynamically significant intracranial or extracranial stenosis,  large vessel occlusion, or aneurysm. Specifically, no evidence of  right-sided occlusion to explain left-sided facial droop.      Scattered non hemodynamically significant atherosclerosis as  described above.      Calcified bilateral pleural plaques likely related to prior asbestos  exposure.  Interventions:   Patient received levetiracetam 2000 mg in the ED and LR 1L bolus    Social History:  - Coming from sister-in-law  - Tobacco: No  - Alcohol: No  - Illicit Drug: No    HealthCare Providers:  - PCP: No Assigned PCP Generic Provider, MD     Code Status: Full Code     Emergency contact: Extended Emergency Contact Information  Primary Emergency Contact:  Patti Dale  Mobile Phone: [phone]  Relation: Spouse  Preferred language: English   needed? No     Past Medical History     Past Medical History:   Diagnosis Date    Stroke (Multi)         Surgical History   History reviewed. No pertinent surgical history.    Family History   No family history on file.    Social History     Social History     Socioeconomic History    Marital status:      Spouse name: Not on file    Number of children: Not on file    Years of education: Not on file    Highest education level: Not on file   Occupational History    Not on file   Tobacco Use    Smoking status: Never    Smokeless tobacco: Never   Vaping Use    Vaping status: Never Used   Substance and Sexual Activity    Alcohol use: Not on file    Drug use: Not on file    Sexual activity: Not on file   Other Topics Concern    Not on file   Social History Narrative    Not on file     Social Determinants of Health     Financial Resource Strain: Low Risk  (8/22/2024)    Overall Financial Resource Strain (CARDIA)     Difficulty of Paying Living Expenses: Not hard at all   Food Insecurity: Not on file   Transportation Needs: No Transportation Needs (8/22/2024)    PRAPARE - Transportation     Lack of Transportation (Medical): No     Lack of Transportation (Non-Medical): No   Physical Activity: Not on file   Stress: Not on file   Social Connections: Not on file   Intimate Partner Violence: Not on file   Housing Stability: Low Risk  (8/22/2024)    Housing Stability Vital Sign     Unable to Pay for Housing in the Last Year: No     Number of Times Moved in the Last Year: 1     Homeless in the Last Year: No       Tobacco Use: Low Risk  (8/22/2024)    Patient History     Smoking Tobacco Use: Never     Smokeless Tobacco Use: Never     Passive Exposure: Not on file        Social History     Substance and Sexual Activity   Alcohol Use None        Allergies   Not on File       Meds    Scheduled medications  lactated  "Ringer's, 1,000 mL, intravenous, Once  melatonin, 5 mg, oral, Nightly  polyethylene glycol, 17 g, oral, Daily      Continuous medications     PRN medications  PRN medications: acetaminophen **OR** acetaminophen **OR** acetaminophen, ondansetron **OR** ondansetron     Objective    Physical Exam  Constitutional:       Appearance: He is obese.   HENT:      Head: Atraumatic.   Cardiovascular:      Rate and Rhythm: Normal rate and regular rhythm.      Pulses: Normal pulses.      Heart sounds: Normal heart sounds. No murmur heard.     No gallop.   Pulmonary:      Effort: Pulmonary effort is normal. No respiratory distress.      Breath sounds: Normal breath sounds. No wheezing or rales.   Abdominal:      General: Abdomen is flat. There is no distension.      Palpations: Abdomen is soft.      Tenderness: There is no abdominal tenderness. There is no guarding or rebound.   Musculoskeletal:      Right lower leg: No edema.      Left lower leg: No edema.   Skin:     Comments: Lumbar spine surgical scar present, slightly warm to the touch and swelling   Neurological:      Comments: Unable to perform full neuro exam due to patient's altered state          Visit Vitals  BP (!) 145/99   Pulse 81   Resp 17   Ht 1.727 m (5' 8\")   Wt 103 kg (227 lb 8.2 oz)   SpO2 (!) 93%   BMI 34.59 kg/m²   Smoking Status Never   BSA 2.22 m²          Intake/Output Summary (Last 24 hours) at 8/22/2024 1849  Last data filed at 8/22/2024 1706  Gross per 24 hour   Intake 1200 ml   Output --   Net 1200 ml             Labs:   Results from last 72 hours   Lab Units 08/22/24  1256   SODIUM mmol/L 137   POTASSIUM mmol/L 3.8   CHLORIDE mmol/L 102   CO2 mmol/L 19*   BUN mg/dL 23   CREATININE mg/dL 1.38*   GLUCOSE mg/dL 193*   CALCIUM mg/dL 9.2   ANION GAP mmol/L 20   EGFR mL/min/1.73m*2 50*      Results from last 72 hours   Lab Units 08/22/24  1702 08/22/24  1256   WBC AUTO x10*3/uL 61.5* 57.4*   HEMOGLOBIN g/dL 13.9 14.1   HEMATOCRIT % 43.3 43.6   PLATELETS " "AUTO x10*3/uL 141* 161   NEUTROS PCT AUTO %  --  11.6   LYMPHO PCT MAN % 78.0  --    LYMPHS PCT AUTO %  --  87.0   MONO PCT MAN % 1.0  --    MONOS PCT AUTO %  --  0.5   EOSINO PCT MAN % 0.0  --    EOS PCT AUTO %  --  0.4      Lab Results   Component Value Date    CALCIUM 9.2 08/22/2024      No results found for: \"CRP\"    [unfilled]     Micro/ID:   No results found for the last 90 days.    No results found for: \"URINECULTURE\", \"BLOODCULT\", \"CSFCULTSMEAR\"                 No lab exists for component: \"AGALPCRNB\"       Images    CT lumbar spine wo IV contrast  Narrative: Interpreted By:  Jennifer Mcodnald,   STUDY:  CT LUMBAR SPINE WO IV CONTRAST  8/22/2024 6:10 pm      INDICATION:  Signs/Symptoms:c/f abscess      COMPARISON:  None.      ACCESSION NUMBER(S):  [id number]      ORDERING CLINICIAN:  ALEXA HERNANDEZ      TECHNIQUE:  Axial CT images of the lumbar spine are obtained. Axial, coronal and  sagittal reconstructions are provided for review.      FINDINGS:  There are 5 lumbar type non rib-bearing vertebral bodies, with the  lowest well-formed intervertebral disc space labeled L5-S1.      Postsurgical changes consistent with discectomies and intervertebral  disc grafts at L3-L4 and L4-L5 with posterior spinal fusion with  pedicle screws and interlocking rods extending from the level of  L3-L4. Associated beam hardening artifact slightly limits evaluation  of the adjacent structures. Within limits of the exam there is no  evidence of perihardware lucency or fracture.      There is a 8-9 mm anterolisthesis of L4 on L5, with bony ankylosis of  L3-L4 and L4-L5. Lumbar vertebral alignment of the levels is  maintained.      Lumbar vertebral body heights are preserved without evidence of  compression fractures.      There is no evidence of acute trauma to the posterior elements of the  lumbar spine. Multilevel degenerate facet osteoarthropathy thrombosis  is present, most pronounced at L4-L5 and L5-S1 on the " right.      Subtle spinal canal stenosis is present, with at least moderate  narrowing suspected at the levels of L2-L3 due to disc osteophyte  complex and ligamentum flavum thickening and L3-L4 due to disc  osteophyte complex and ossification of the posterior longitudinal  ligament.      At least moderate stenosis is also present at the level of L4-L5 due  to spondylolisthesis, hypertrophic facet changes and disc osteophyte  complex.      Multilevel neural foraminal stenosis is present, with moderate to  severe narrowing suspected at the level of L2-L3 due to hypertrophic  facet changes, and at least moderate narrowing present at L3-L4 and  L4-L5.      Prevertebral and paraspinal soft tissues do not demonstrate any acute  abnormality.      Impression: 1.  No evidence of acute process. Paraspinal soft tissues are  unremarkable in appearance without evidence of fluid collections or  soft tissue gas.  2. Degenerate present lumbar spine, with at least moderate stenosis  suspected at the levels of L2-L3, L3-L4 and L4-L5 due to combination  of disc osteophyte complexes, ligamentum flavum thickening,  hypertrophic facet changes and spondylolisthesis at L4-L5.  3. Postsurgical changes of the lumbar spine consistent with  discectomies and intervertebral disc space series L3-L4 and L4-L5 and  posterior spinal fusion with pedicle screws and interlocking rods at  L3-L4.      MACRO:  None      Signed by: Jennifer Mcdonald 8/22/2024 6:29 PM  Dictation workstation:   [id number]  XR chest 1 view  Narrative: Interpreted By:  Vitaliy Dumont,   STUDY:  XR CHEST 1 VIEW;  8/22/2024 5:15 pm      INDICATION:  Signs/Symptoms:eval PNA.      COMPARISON:  None.      ACCESSION NUMBER(S):  [id number]      ORDERING CLINICIAN:  ANNEMARIE TREADWELL      FINDINGS:          CARDIOMEDIASTINAL SILHOUETTE:  Cardiomediastinal silhouette is normal in size and configuration.      LUNGS:  No consolidation, pneumothorax, or significant effusion.  Mild  bilateral reticular changes. Calcified pleural plaque along the right  likely rib likely in prior specific exposure.      ABDOMEN:  No remarkable upper abdominal findings.      BONES:  No acute osseous changes.      Impression: 1.  No evidence of acute cardiopulmonary process.          Signed by: Vitaliy Dumont 8/22/2024 5:19 PM  Dictation workstation:   [id number]  CT brain attack angio head and neck W and WO IV contrast  Narrative: Interpreted By:  Eduar Rosario,   STUDY:  CT BRAIN ATTACK ANGIO HEAD AND NECK W AND WO IV CONTRAST;  8/22/2024  1:08 pm      INDICATION:  Signs/Symptoms:facial droop with aphasia. concern for bleed      COMPARISON:  08/22/2024 CT head without contrast      ACCESSION NUMBER(S):  [id number]      ORDERING CLINICIAN:  DEREK REY      TECHNIQUE:  Following IV contrast administration of iodinated contrast, a CT  angiography of the head and neck was performed. MIPS and 3D  reconstructions of the [place] and neck were created on an  independent workstation and reviewed.      FINDINGS:  There are calcified pleural plaques.      CTA NECK:              LEFT VERTEBRAL ARTERY:  No hemodynamically significant stenosis,  occlusion, or dissection.      LEFT COMMON/INTERNAL CAROTID ARTERY: Minimal calcific atherosclerosis  of the distal CCA and left carotid bulb. No hemodynamically  significant stenosis, occlusion, or dissection.      RIGHT VERTEBRAL ARTERY:  No hemodynamically significant stenosis,  occlusion, or dissection.      RIGHT COMMON/INTERNAL CAROTID ARTERY: Minimal calcific  atherosclerosis of the carotid bulb. No hemodynamically significant  stenosis, occlusion, or dissection.          The neck soft tissues show no evidence of mass, fluid collection, or  enlarged lymph nodes. Multinodular goiter.  There is no acute osseous abnormality.  Multilevel degenerative disc disease with disc spur complexes  resulting in moderate C3-C4 canal stenosis, mild to moderate  C5-C6  canal stenosis and mild C6-7 canal stenosis. Multilevel osteophytic  neural foraminal stenosis notable for moderate-severe right C2-C3  foraminal stenosis, severe right C3-C4 foraminal stenosis, moderate  severe left C4-C5 foraminal stenosis, severe right and moderate left  C5-C6 foraminal stenosis and moderate C6-C7 foraminal silhouette              CTA HEAD:      ANTERIOR CIRCULATION: No aneurysm.      - Internal Carotid Arteries: Mild calcific atherosclerosis  bilaterally. No hemodynamically significant stenosis or occlusion.      - Middle Cerebral Arteries: There is a paucity of left M4 vessels in  the region the chronic infarct of the inferior MCA division;  otherwise, no hemodynamically significant stenosis or occlusion.      - Anterior Cerebral Arteries:  No hemodynamically significant  stenosis or occlusion.          POSTERIOR CIRCULATION: No aneurysm.      - Intracranial Vertebral Arteries: There is calcific atherosclerosis  of the bilateral intracranial vertebral arteries without significant  stenosis.      - Basilar Artery:  No hemodynamically significant stenosis or  occlusion.      - Posterior Cerebral Arteries: There is fetal origin of the left  posterior cerebral artery. No hemodynamically significant stenosis or  occlusion.      No arteriovenous malformation is visualized.  No pathologic intracranial enhancement or discrete mass.  The dural venous sinuses are patent.      MIPS and 3D reconstructions confirm the above findings.      Impression: No hemodynamically significant intracranial or extracranial stenosis,  large vessel occlusion, or aneurysm. Specifically, no evidence of  right-sided occlusion to explain left-sided facial droop.      Scattered non hemodynamically significant atherosclerosis as  described above.      Calcified bilateral pleural plaques likely related to prior asbestos  exposure.      MACRO:  None.      Signed by: Eduar Rosario 8/22/2024 1:34 PM  Dictation workstation:    [id number]  CT brain attack head wo IV contrast  Narrative: Interpreted By:  Dane Perkins,   STUDY:  CT BRAIN ATTACK HEAD WO IV CONTRAST;  8/22/2024 1:04 pm      INDICATION:  Signs/Symptoms:Stroke Evaluation.      COMPARISON:  None.      ACCESSION NUMBER(S):  [id number]      ORDERING CLINICIAN:  DEREK REY      TECHNIQUE:  Noncontrast axial CT scan of head was performed. Angled reformats in  brain and bone windows were generated. The images were reviewed in  bone, brain, blood and soft tissue windows.      FINDINGS:  CSF Spaces: The ventricles, sulci and basal cisterns are prominent.  There is no extraaxial fluid collection.      Parenchyma: Moderate cerebral volume loss. Right frontal and left  temporoparietal CSF density areas likely related to prior infarcts.  Bilateral periventricular white matter ill-defined hypodensities  likely sequelae of chronic microvascular ischemia. No serious brain  herniation or shift of midline structures. No major intracranial  hemorrhage.      Calvarium: The calvarium is unremarkable.      Paranasal sinuses and mastoids: Visualized paranasal sinuses and  mastoids are clear.      Impression: 1. No acute intracranial hemorrhage or serious brain herniation  2. Right frontal and left temporoparietal CSF density  areas/encephalomalacia likely sequelae of prior infarcts.  3. Moderate cerebral volume loss as well as sequelae of chronic  microvascular ischemia.      If continued clinical concern for acute neurological deficit, advise  further assessment by dedicated brain MRI.      MACRO:  None      Signed by: Dane Perkins 8/22/2024 1:14 PM  Dictation workstation:   [id number]       No results found for this or any previous visit (from the past 4464 hour(s)).   No results found for this or any previous visit (from the past 4464 hour(s)).     [unfilled]     [unfilled]     Assessment and Plan    Arturo Dale is a 84 y.o. male admitted on 8/22/2024 with history of 2 strokes,  most recent in January 2024, seizure disorder, leukemia, vascular dementia, HLD, and HTN presenting for seizure.    ACUTE MEDICAL ISSUES:  #Acute Metabolic Encephalopathy in the setting of Seizure Disorder  #Chronic history of Leukemia, Immunocompromised  #Rule out Viral/Bacterial Meningitis  #Rule out Infected Spinal Hardware  -Significantly elevated leukocytosis increased from 57.4 -->61.5, lymphocyte predominance but also includes elevated segmental and banded neutrophils.   -Thrombocytopenia with decreased platelets from 161--> 141  -Reported history of CLL according to sister-in law  -CMV and EBV encephalopathy cannot be ruled out, they can all trigger a seizure disorder.  -Lumbar surgical scar was noted on physical exam with increased warmth on palpation  -Head, neck, and brain CT unimpressive for acute intracranial cause  -CXR unremarkable for acute process.  -UA unremarkable  -Vitals stable on admission  Plan:  -Inflammatory Markers   -EBV PCR, CMV IgG and IgM   -Lyme Disease PCR  -[place] antibodies igG and IgM  -Procalcitonin   -CT Abd/Pelvis to rule out abdominal abcess  -CT Lumbar Spine for lumbar swelling  -Possible Lumbar Puncture   -Possible Hemoglobin Electrophoresis  -Blood Culture pending  -Neurology Consult  -No antibiotics at this time  -Continue home Keppra    #ALDO, likely prerenal  -Creatinine of 1.38 on admission  -Received 1L of LR bolus  Plan:  -additional 1L LR bolus  -Morning labs  -Monitor I + O    CHRONIC MEDICAL ISSUES:  #HLD- Continue home Lipitor  #HTN- Continue home metoprolol  #History of CVA- Continue home Warfarin    Fluids: Received 2L LR bolus total  Electrolytes: PRN  Nutrition: NPO, pending bedside swallow  Antimicrobials: None currently  Lines: PIV  Supplemental Oxygen: RA  Emergency Contact: Extended Emergency Contact Information  Primary Emergency Contact: Patti Dale  Mobile Phone: [phone]  Relation: Spouse  Preferred language: English   needed?  No   Code: Full Code     Disposition: ELOS>48 hours    Efrain Parker DO  Internal Medicine, PGY- 1  08/22/24 at 6:49 PM

## 2024-08-23 ENCOUNTER — HOSPITAL ENCOUNTER (INPATIENT)
Dept: NEUROLOGY | Facility: HOSPITAL | Age: 84
Discharge: HOME | DRG: 100 | End: 2024-08-23
Payer: MEDICARE

## 2024-08-23 ENCOUNTER — PHARMACY VISIT (OUTPATIENT)
Dept: PHARMACY | Facility: CLINIC | Age: 84
End: 2024-08-23

## 2024-08-23 PROBLEM — F32.5 MAJOR DEPRESSION IN REMISSION (CMS-HCC): Status: ACTIVE | Noted: 2024-08-23

## 2024-08-23 PROBLEM — N52.03 COMBINED ARTERIAL INSUFFICIENCY AND CORPORO-VENOUS OCCLUSIVE ERECTILE DYSFUNCTION: Status: ACTIVE | Noted: 2024-08-23

## 2024-08-23 PROBLEM — E55.9 VITAMIN D DEFICIENCY: Status: ACTIVE | Noted: 2024-08-23

## 2024-08-23 PROBLEM — C91.10 CHRONIC LYMPHOCYTIC LEUKEMIA (MULTI): Status: ACTIVE | Noted: 2024-08-23

## 2024-08-23 PROBLEM — I69.320 APHASIA AS LATE EFFECT OF CEREBROVASCULAR ACCIDENT: Status: ACTIVE | Noted: 2024-08-23

## 2024-08-23 PROBLEM — R41.82 ALTERED MENTAL STATUS, UNSPECIFIED ALTERED MENTAL STATUS TYPE: Status: RESOLVED | Noted: 2024-08-22 | Resolved: 2024-08-23

## 2024-08-23 PROBLEM — D68.59 HYPERCOAGULABLE STATE (MULTI): Status: ACTIVE | Noted: 2024-08-23

## 2024-08-23 PROBLEM — N40.1 LOWER URINARY TRACT SYMPTOMS DUE TO BENIGN PROSTATIC HYPERPLASIA: Status: ACTIVE | Noted: 2024-08-23

## 2024-08-23 PROBLEM — E29.1 TESTICULAR HYPOFUNCTION: Status: ACTIVE | Noted: 2024-08-23

## 2024-08-23 PROBLEM — G40.909 SEIZURE DISORDER (MULTI): Status: ACTIVE | Noted: 2024-08-23

## 2024-08-23 PROBLEM — E11.69 TYPE 2 DIABETES MELLITUS WITH OTHER SPECIFIED COMPLICATION (MULTI): Status: ACTIVE | Noted: 2024-08-23

## 2024-08-23 PROBLEM — J61 ASBESTOSIS (MULTI): Status: ACTIVE | Noted: 2024-08-23

## 2024-08-23 PROBLEM — I48.0 PAROXYSMAL ATRIAL FIBRILLATION (MULTI): Status: ACTIVE | Noted: 2024-08-23

## 2024-08-23 PROBLEM — D84.9 IMMUNOLOGIC DEFICIENCY SYNDROME (MULTI): Status: ACTIVE | Noted: 2024-08-23

## 2024-08-23 PROBLEM — N18.32 STAGE 3B CHRONIC KIDNEY DISEASE (MULTI): Status: ACTIVE | Noted: 2024-08-23

## 2024-08-23 PROBLEM — F03.918 DEMENTIA WITH BEHAVIORAL DISTURBANCE (MULTI): Status: ACTIVE | Noted: 2024-08-23

## 2024-08-23 PROBLEM — R35.0 URINARY FREQUENCY: Status: ACTIVE | Noted: 2024-08-23

## 2024-08-23 PROBLEM — R33.9 RETENTION OF URINE: Status: ACTIVE | Noted: 2024-08-23

## 2024-08-23 PROBLEM — I10 HYPERTENSION: Status: ACTIVE | Noted: 2024-08-23

## 2024-08-23 PROBLEM — I69.959 HEMIPLEGIA OF DOMINANT SIDE AS LATE EFFECT FOLLOWING CEREBROVASCULAR DISEASE (MULTI): Status: ACTIVE | Noted: 2024-08-23

## 2024-08-23 PROBLEM — I27.20 PULMONARY HYPERTENSION (MULTI): Status: ACTIVE | Noted: 2024-08-23

## 2024-08-23 PROBLEM — G89.29 CHRONIC PAIN: Status: ACTIVE | Noted: 2024-08-23

## 2024-08-23 PROBLEM — E78.2 MIXED HYPERLIPIDEMIA: Status: ACTIVE | Noted: 2024-08-23

## 2024-08-23 LAB
ALBUMIN SERPL BCP-MCNC: 3.7 G/DL (ref 3.4–5)
ANION GAP SERPL CALC-SCNC: 11 MMOL/L (ref 10–20)
ATRIAL RATE: 82 BPM
BASOPHILS # BLD AUTO: 0.04 X10*3/UL (ref 0–0.1)
BASOPHILS NFR BLD AUTO: 0.1 %
BUN SERPL-MCNC: 19 MG/DL (ref 6–23)
CALCIUM SERPL-MCNC: 9.2 MG/DL (ref 8.6–10.3)
CHLORIDE SERPL-SCNC: 104 MMOL/L (ref 98–107)
CMV IGG AVIDITY SERPL IA-RTO: NONREACTIVE %
CO2 SERPL-SCNC: 27 MMOL/L (ref 21–32)
CREAT SERPL-MCNC: 1.18 MG/DL (ref 0.5–1.3)
EBV DNA SPEC NAA+PROBE-LOG#: NORMAL {LOG_COPIES}/ML
EGFRCR SERPLBLD CKD-EPI 2021: 60 ML/MIN/1.73M*2
EOSINOPHIL # BLD AUTO: 0.14 X10*3/UL (ref 0–0.4)
EOSINOPHIL NFR BLD AUTO: 0.3 %
ERYTHROCYTE [DISTWIDTH] IN BLOOD BY AUTOMATED COUNT: 13.8 % (ref 11.5–14.5)
GLUCOSE BLD MANUAL STRIP-MCNC: 93 MG/DL (ref 74–99)
GLUCOSE SERPL-MCNC: 92 MG/DL (ref 74–99)
HCT VFR BLD AUTO: 42.2 % (ref 41–52)
HGB BLD-MCNC: 13.4 G/DL (ref 13.5–17.5)
IMM GRANULOCYTES # BLD AUTO: 0.12 X10*3/UL (ref 0–0.5)
IMM GRANULOCYTES NFR BLD AUTO: 0.3 % (ref 0–0.9)
INR PPP: 1.1 (ref 0.9–1.1)
LABORATORY COMMENT REPORT: NOT DETECTED
LEVETIRACETAM SERPL-MCNC: 18 UG/ML (ref 10–40)
LEVETIRACETAM SERPL-MCNC: 64 UG/ML (ref 10–40)
LYMPHOCYTES # BLD AUTO: 38.9 X10*3/UL (ref 0.8–3)
LYMPHOCYTES NFR BLD AUTO: 85.8 %
MAGNESIUM SERPL-MCNC: 2 MG/DL (ref 1.6–2.4)
MCH RBC QN AUTO: 29.5 PG (ref 26–34)
MCHC RBC AUTO-ENTMCNC: 31.8 G/DL (ref 32–36)
MCV RBC AUTO: 93 FL (ref 80–100)
MONOCYTES # BLD AUTO: 0.74 X10*3/UL (ref 0.05–0.8)
MONOCYTES NFR BLD AUTO: 1.6 %
NEUTROPHILS # BLD AUTO: 5.41 X10*3/UL (ref 1.6–5.5)
NEUTROPHILS NFR BLD AUTO: 11.9 %
NRBC BLD-RTO: 0 /100 WBCS (ref 0–0)
P AXIS: 48 DEGREES
P OFFSET: 164 MS
P ONSET: 105 MS
PATH REVIEW-CBC DIFFERENTIAL: NORMAL
PHOSPHATE SERPL-MCNC: 3.2 MG/DL (ref 2.5–4.9)
PLATELET # BLD AUTO: 154 X10*3/UL (ref 150–450)
POTASSIUM SERPL-SCNC: 3.8 MMOL/L (ref 3.5–5.3)
PR INTERVAL: 208 MS
PROCALCITONIN SERPL-MCNC: 0.05 NG/ML
PROTHROMBIN TIME: 12.1 SECONDS (ref 9.8–12.8)
Q ONSET: 209 MS
QRS COUNT: 14 BEATS
QRS DURATION: 148 MS
QT INTERVAL: 424 MS
QTC CALCULATION(BAZETT): 495 MS
QTC FREDERICIA: 470 MS
R AXIS: -65 DEGREES
RBC # BLD AUTO: 4.54 X10*6/UL (ref 4.5–5.9)
RBC MORPH BLD: NORMAL
SODIUM SERPL-SCNC: 138 MMOL/L (ref 136–145)
T AXIS: 21 DEGREES
T OFFSET: 421 MS
TREPONEMA PALLIDUM IGG+IGM AB [PRESENCE] IN SERUM OR PLASMA BY IMMUNOASSAY: NONREACTIVE
VENTRICULAR RATE: 82 BPM
WBC # BLD AUTO: 45.4 X10*3/UL (ref 4.4–11.3)

## 2024-08-23 PROCEDURE — 82947 ASSAY GLUCOSE BLOOD QUANT: CPT

## 2024-08-23 PROCEDURE — 99223 1ST HOSP IP/OBS HIGH 75: CPT

## 2024-08-23 PROCEDURE — 1200000002 HC GENERAL ROOM WITH TELEMETRY DAILY

## 2024-08-23 PROCEDURE — 2500000001 HC RX 250 WO HCPCS SELF ADMINISTERED DRUGS (ALT 637 FOR MEDICARE OP)

## 2024-08-23 PROCEDURE — 85610 PROTHROMBIN TIME: CPT

## 2024-08-23 PROCEDURE — 80069 RENAL FUNCTION PANEL: CPT

## 2024-08-23 PROCEDURE — 95819 EEG AWAKE AND ASLEEP: CPT | Performed by: PSYCHIATRY & NEUROLOGY

## 2024-08-23 PROCEDURE — 2500000002 HC RX 250 W HCPCS SELF ADMINISTERED DRUGS (ALT 637 FOR MEDICARE OP, ALT 636 FOR OP/ED)

## 2024-08-23 PROCEDURE — RXMED WILLOW AMBULATORY MEDICATION CHARGE

## 2024-08-23 PROCEDURE — 80177 DRUG SCRN QUAN LEVETIRACETAM: CPT | Mod: GEALAB

## 2024-08-23 PROCEDURE — 95819 EEG AWAKE AND ASLEEP: CPT

## 2024-08-23 PROCEDURE — 2500000001 HC RX 250 WO HCPCS SELF ADMINISTERED DRUGS (ALT 637 FOR MEDICARE OP): Performed by: STUDENT IN AN ORGANIZED HEALTH CARE EDUCATION/TRAINING PROGRAM

## 2024-08-23 PROCEDURE — 36415 COLL VENOUS BLD VENIPUNCTURE: CPT

## 2024-08-23 PROCEDURE — 83735 ASSAY OF MAGNESIUM: CPT

## 2024-08-23 PROCEDURE — 85025 COMPLETE CBC W/AUTO DIFF WBC: CPT

## 2024-08-23 RX ORDER — MAGNESIUM HYDROXIDE 2400 MG/10ML
10 SUSPENSION ORAL 2 TIMES DAILY PRN
Status: DISCONTINUED | OUTPATIENT
Start: 2024-08-23 | End: 2024-08-26 | Stop reason: HOSPADM

## 2024-08-23 RX ORDER — LACOSAMIDE 100 MG/1
50 TABLET ORAL 2 TIMES DAILY
Status: DISCONTINUED | OUTPATIENT
Start: 2024-08-23 | End: 2024-08-23

## 2024-08-23 RX ORDER — METOPROLOL TARTRATE 25 MG/1
25 TABLET, FILM COATED ORAL DAILY
Status: DISCONTINUED | OUTPATIENT
Start: 2024-08-23 | End: 2024-08-26 | Stop reason: HOSPADM

## 2024-08-23 RX ORDER — LEVETIRACETAM 500 MG/1
500 TABLET ORAL
Status: DISCONTINUED | OUTPATIENT
Start: 2024-08-23 | End: 2024-08-23

## 2024-08-23 RX ORDER — LEVETIRACETAM 500 MG/1
1000 TABLET ORAL
Qty: 120 TABLET | Refills: 2 | Status: SHIPPED | OUTPATIENT
Start: 2024-08-23 | End: 2024-11-21

## 2024-08-23 RX ORDER — AMANTADINE HYDROCHLORIDE 100 MG/1
100 CAPSULE, GELATIN COATED ORAL
Status: DISCONTINUED | OUTPATIENT
Start: 2024-08-23 | End: 2024-08-26 | Stop reason: HOSPADM

## 2024-08-23 RX ORDER — AMOXICILLIN 250 MG
2 CAPSULE ORAL 2 TIMES DAILY
Status: DISCONTINUED | OUTPATIENT
Start: 2024-08-23 | End: 2024-08-26 | Stop reason: HOSPADM

## 2024-08-23 RX ORDER — LACOSAMIDE 50 MG/1
50 TABLET ORAL 2 TIMES DAILY
Qty: 60 TABLET | Refills: 5 | Status: SHIPPED | OUTPATIENT
Start: 2024-08-23 | End: 2024-08-23 | Stop reason: HOSPADM

## 2024-08-23 RX ORDER — POLYETHYLENE GLYCOL 3350 17 G/17G
17 POWDER, FOR SOLUTION ORAL DAILY
Status: DISCONTINUED | OUTPATIENT
Start: 2024-08-23 | End: 2024-08-26 | Stop reason: HOSPADM

## 2024-08-23 RX ORDER — LEVETIRACETAM 500 MG/1
1000 TABLET ORAL
Status: DISCONTINUED | OUTPATIENT
Start: 2024-08-23 | End: 2024-08-26 | Stop reason: HOSPADM

## 2024-08-23 RX ORDER — WARFARIN 3 MG/1
6 TABLET ORAL DAILY
Status: DISCONTINUED | OUTPATIENT
Start: 2024-08-23 | End: 2024-08-25

## 2024-08-23 RX ORDER — CITALOPRAM 20 MG/1
20 TABLET, FILM COATED ORAL
Status: DISCONTINUED | OUTPATIENT
Start: 2024-08-23 | End: 2024-08-26 | Stop reason: HOSPADM

## 2024-08-23 ASSESSMENT — COGNITIVE AND FUNCTIONAL STATUS - GENERAL
CLIMB 3 TO 5 STEPS WITH RAILING: TOTAL
STANDING UP FROM CHAIR USING ARMS: A LOT
STANDING UP FROM CHAIR USING ARMS: A LOT
CLIMB 3 TO 5 STEPS WITH RAILING: TOTAL
DAILY ACTIVITIY SCORE: 12
MOVING FROM LYING ON BACK TO SITTING ON SIDE OF FLAT BED WITH BEDRAILS: A LITTLE
DAILY ACTIVITIY SCORE: 12
MOVING TO AND FROM BED TO CHAIR: A LOT
HELP NEEDED FOR BATHING: A LOT
EATING MEALS: A LOT
MOBILITY SCORE: 11
DRESSING REGULAR UPPER BODY CLOTHING: A LOT
MOVING TO AND FROM BED TO CHAIR: A LOT
MOBILITY SCORE: 11
DRESSING REGULAR LOWER BODY CLOTHING: A LOT
TOILETING: A LOT
PERSONAL GROOMING: A LOT
WALKING IN HOSPITAL ROOM: TOTAL
TURNING FROM BACK TO SIDE WHILE IN FLAT BAD: A LOT
PERSONAL GROOMING: A LOT
HELP NEEDED FOR BATHING: A LOT
DRESSING REGULAR LOWER BODY CLOTHING: A LOT
MOVING FROM LYING ON BACK TO SITTING ON SIDE OF FLAT BED WITH BEDRAILS: A LITTLE
TURNING FROM BACK TO SIDE WHILE IN FLAT BAD: A LOT
TOILETING: A LOT
EATING MEALS: A LOT
WALKING IN HOSPITAL ROOM: TOTAL
DRESSING REGULAR UPPER BODY CLOTHING: A LOT

## 2024-08-23 ASSESSMENT — PAIN SCALES - GENERAL
PAINLEVEL_OUTOF10: 0 - NO PAIN
PAINLEVEL_OUTOF10: 0 - NO PAIN

## 2024-08-23 ASSESSMENT — PAIN SCALES - PAIN ASSESSMENT IN ADVANCED DEMENTIA (PAINAD)
CONSOLABILITY: NO NEED TO CONSOLE
FACIALEXPRESSION: SMILING OR INEXPRESSIVE
TOTALSCORE: 0
BODYLANGUAGE: RELAXED
BREATHING: NORMAL

## 2024-08-23 ASSESSMENT — PAIN - FUNCTIONAL ASSESSMENT: PAIN_FUNCTIONAL_ASSESSMENT: PAINAD (PAIN ASSESSMENT IN ADVANCED DEMENTIA SCALE)

## 2024-08-23 NOTE — DISCHARGE SUMMARY
Discharge Diagnosis  Altered mental status, unspecified altered mental status type    Issues Requiring Follow-Up  Breakthrough Seizure    Discharge Meds     Your medication list        CHANGE how you take these medications        Instructions Last Dose Given Next Dose Due   levETIRAcetam 500 mg tablet  Commonly known as: Keppra  What changed: how much to take      Take 2 tablets (1,000 mg) by mouth every 12 hours.              CONTINUE taking these medications        Instructions Last Dose Given Next Dose Due   amantadine 100 mg capsule  Commonly known as: Symmetrel           cholecalciferol 5,000 Units tablet  Commonly known as: Vitamin D-3           cinnamon 500 mg capsule           citalopram 20 mg tablet  Commonly known as: CeleXA           cranberry 500 mg capsule           metoprolol tartrate 25 mg tablet  Commonly known as: Lopressor           Vitamin B-12 2,500 mcg tablet, sublingual SL tablet  Generic drug: cyanocobalamin (vitamin B-12)           warfarin 6 mg tablet  Commonly known as: Coumadin                  STOP taking these medications      UNABLE TO FIND                  Where to Get Your Medications        These medications were sent to [facility]  [id number] , [facility] [address]      Hours: 9 AM to 5 PM Mon-Fri Phone: [phone]   levETIRAcetam 500 mg tablet         Test Results Pending At Discharge  Pending Labs       Order Current Status    Blood Gas Lactic Acid, Venous In process    CMV IgG, IgM In process    Cytomegalovirus Antibody, IgM In process    Lyme disease, PCR In process    West Nile antibodies, IgG and IgM In process    Blood Culture Preliminary result    Blood Culture Preliminary result        KATHI Dale is a 84 y.o. year old male  patient with history of 2 strokes, most recent in January 2024, seizure disorder, CVA, afib with RVR, vascular dementia, COPD, and HTN presenting for seizure. Patient is non-verbal, originally from [place]. His  sister-in-law is present on evaluation. Unfortunately his sister-in-law was not aware of much of the patient's history, however she witnessed the seizure occurring this morning after the patient had finished his breakfast. She described his movements as upper and lower extremity jerking motions that lasted for over 5 minutes. She had contacted the paramedics and had laid the patient on the ground. The patient did not fall or injure himself. The sister-in-law reports that he may have had a fever when she went to place him as his forehead was very hot. According to EMS, the patient had a lactate of 10 prior to arrival in the ED. Otherwise the family member does not believe the patient was having any other symptoms including shortness of breath, chest pain, dysuria, nausea, vomiting, abdominal pain, or diarrhea.     ED Course  Patient was significantly altered on presentation to the ED. Per ED notes, patient was withdrawing from pain, had spontaneous eye movement, and was tracking without verbal response. Vitals at the time of evaluation were unremarkable. Urinalysis was positive for trace ketones and glucose. Labs were significant for profound leukocytosis of 57.4, lymphocyte predominant and creatinine of 1.38. The patient had a history of Chronic Lymphocytic Leukemia, which was consistent with his elevated leukocytes. The patient received 2000 mg of Levetiracetam in the ED and 1 liter lactated ringer bolus. Unfortunately keppra levels were taken at the same time as he received the keppra infusion in the ED, which led to a falsely elevated level. The patient's lactate improved to 3.1. On physical exam there appeared to be a surgical scar on the patient's lower lumbar spine with mildly increased temperature on palpation and mild swelling. However, he underwent CXR and multiple CT imaging for his lumbar spine, brain, head, neck, and abdomen which were unremarkable for acute processes. The patient was then transferred to  the general floor.    General Floor  On the general floor, the patient's home medication was continued, including his home dose of Keppra. The patient became more alert on the general floor. He was placed on telemetry, however unfortunately the patient kept repeatedly removing the telemetry leads. Furthermore, while the patient had passed his bedside swallow evaluation and was given a diet, he kept rejecting meals. The patient received one dose of Zyprexa over night for sedation. On the day of discharge, the patient underwent an EEG. He was more alert and oriented x 1. The patient was more talkative and was able to answer some questions, however he was unable to follow commands. Per the patient's wife, this appears to be the patient's baseline. His wbc count decreased to 45.4 and appears to be chronic due to the patient's history of Leukemia. His creatinine improved to 1.18 and the remainder of the patient's labs were unremarkable. The patient's blood smear was significant for smudge cells. Furthermore, the patients inflammatory markers were negative. INR was decreased from expected during hospital stay, patient was initiated on Enoxaparin every 12 hours in addition to his home warfarin dose. PT/OT recommended moderate intensity level of care upon discharge.     Discharge Instructions  Please, take your home medications as instructed after being discharged from the hospital.     NEW MEDICATIONS:  Increase your Keppra dose to 1000mg (two tablets) twice a day     UPCOMING APPOINTMENTS:    Please, follow-up with your PCP within 7 to 14 days after leaving the hospital, please follow-up with Neurology  within one month of discharge from the [facility] [place]./appointment services has been requested to make an appointment for you, however if you do not hear back from them within 1 to 2 days, please call your primary physician's office to schedule an appointment. Bring your photo ID and insurance card to your appointment.    [facility] services can be reached at [phone].     If you experience any worsening symptoms or have any concerns, please contact your primary care provider to schedule an appointment. If you cannot get in touch with your primary care physician, please return to the nearest emergency room or urgent care clinic for an evaluation and treatment.     Thank you for choosing [facility] and allowing us to partake in your medical care!     - Your [facility] inpatient primary care team.    Pertinent Physical Exam At Time of Discharge  Physical Exam  Constitutional:       General: He is not in acute distress.     Appearance: He is not ill-appearing.      Comments: Alert and Oriented x 1, patient was able to speak, but does not respond to questions. Seems to be his baseline   Eyes:      General: No scleral icterus.        Right eye: No discharge.         Left eye: No discharge.      Extraocular Movements: Extraocular movements intact.      Conjunctiva/sclera: Conjunctivae normal.   Cardiovascular:      Rate and Rhythm: Normal rate and regular rhythm.      Pulses: Normal pulses.      Heart sounds: Normal heart sounds. No murmur heard.     No gallop.   Pulmonary:      Effort: Pulmonary effort is normal. No respiratory distress.      Breath sounds: Normal breath sounds. No wheezing or rales.   Abdominal:      General: Abdomen is flat. Bowel sounds are normal. There is no distension.      Palpations: Abdomen is soft.      Tenderness: There is no abdominal tenderness. There is no guarding or rebound.   Musculoskeletal:         General: No swelling or tenderness.   Neurological:      Mental Status: Mental status is at baseline.      Comments: Unable to perform full neuro exam, patient appears to be at baseline         Outpatient Follow-Up  No future appointments.      Efrain Parker, DO

## 2024-08-23 NOTE — CARE PLAN
The patient's goals for the shift include  be free from injury.    The clinical goals for the shift include Patient will remain free from falls.    Over the shift, the patient did not make progress toward the following goals. Barriers to progression include acute illness. Recommendations to address these barriers include medications/PT/OT.

## 2024-08-23 NOTE — PROGRESS NOTES
Occupational Therapy                 Therapy Communication Note    Patient Name: Arturo Dale  MRN: [id number]  Today's Date: 8/23/2024     Discipline: Occupational Therapy    Missed Visit Reason: Missed Visit Reason: Other (Comment) (Attempted at 1420, pt receiving a bed level sponge bath from spouse, who is his primary caregiver. Pt unable to safely participate in OT evaluation at this time, unable to follow commands despite multiple attempts.)    Missed Time: Cancel    Comment:Pt is non-verbal with PMH of 2 strokes (most recently 1/2024), seizures, and vascular dementia. At baseline, pt is A&O x1, dependent for ADL care, performs stand pivot transfers with assist x1-2, and uses wheelchair for mobility. Pt is [place] resident, but current staying with his sister-in-law in [place], [place].  No acute OT needs identified at this time; will discontinue order. TCC spoke with pt's spouse to confirm PLOF and discharge planning.

## 2024-08-23 NOTE — PROGRESS NOTES
08/23/24 1058   Discharge Planning   Living Arrangements Spouse/significant other;Family members  (Lives in [place] with spouse. Currently staying with sister in law at [address] , [facility] [address])   Support Systems Spouse/significant other;Family members   Assistance Needed A&OX1 (knows name but location and year answered 72-sister in law bedside stated this is patient's baseline); dependent on assist for ADLs and uses wheelchair-can transfer self at times but mostly assist x 1-2 to chair; doesn't drive; room air baseline -currently 2L NC   Type of Residence Private residence   Number of Stairs to Enter Residence 0  (sister in laws home)   Number of Stairs Within Residence 0  (sister in laws home)   Do you have animals or pets at home? Yes   Type of Animals or Pets 2 dogs & 1 cat   Who is requesting discharge planning? Provider   Home or Post Acute Services None   Expected Discharge Disposition Home  (Pt wheelchair bound and likely dc home to sister in laws home in [place] no needs)   Does the patient need discharge transport arranged? Yes   RoundTrip coordination needed? Yes   Has discharge transport been arranged? No     8/23/2024 1058: Spoke to spouse who is primary caregiver to confirm details as provided above.     8/23/2024 1530: Call to spouse who reports she received no medical update from the team, she is also interested in seeing if patient would be able to be skilled at time of discharge. Medical team to replace PT/OT evaluations to determine if patient has skilled needs.

## 2024-08-23 NOTE — DISCHARGE INSTRUCTIONS
Please, take your home medications as instructed after being discharged from the hospital.     NEW MEDICATIONS:  Increase your Keppra dose to 1000mg (two tablets) twice a day    UPCOMING APPOINTMENTS:  Please follow up with PCP   Please follow up with Neurology     OTHER INSTRUCTIONS:   Please continue Lovenox 100mg BID until INR in therapeutic range (2-3, patient's baseline is 3)   Continue Warfarin 6mg daily   Check INR daily until therapeutic     Please, follow-up with your PCP within 7 to 14 days after leaving the hospital, please follow-up with Neurology within one month of discharge from the hospital. /appointment services has been requested to make an appointment for you, however if you do not hear back from them within 1 to 2 days, please call your primary physician's office to schedule an appointment. Bring your photo ID and insurance card to your appointment.   [facility]  services can be reached at [phone].     If you experience any worsening symptoms or have any concerns, please contact your primary care provider to schedule an appointment. If you cannot get in touch with your primary care physician, please return to the nearest emergency room or urgent care clinic for an evaluation and treatment.     Thank you for choosing [facility] and allowing us to partake in your medical care!     - Your [facility] inpatient primary care team.

## 2024-08-23 NOTE — PROGRESS NOTES
Physical Therapy                 Therapy Communication Note    Patient Name: Arturo Dale  MRN: [id number]  Today's Date: 8/23/2024     Discipline: Physical Therapy    Missed Visit Reason: Cancel. PT consult received and chart reviewed. Pt is an 83 yo male who presented to [facility] on 8/22/24 for witnessed seizure activity at home. Pt is non-verbal with PMH of 2 strokes (most recently 1/2024), seizures, and vascular dementia. At baseline, pt is A&O x1, dependent for ADL care, performs stand pivot transfers with assist x1-2, and uses wheelchair for mobility. Pt is [place] resident, but current staying with his sister-in-law in [place], [place].  No acute PT needs identified at this time; will discontinue order. TCC spoke with pt's spouse to confirm PLOF and discharge planning.     Missed Time: Cancel

## 2024-08-23 NOTE — NURSING NOTE
2026: pt is getting agitated, confused, wife stated pt needed a sitter in the past, nothing is ordered for agitation, plus patient is ordered to have every 1 hour neuro checks, asking resident Dr. Swenson if he wants pt to have a different level of care. Pt removing tele pads, resident aware.     2130: put tele leads back on    8-23-24/0052: pt needs med rec done still, let resident Dr. Nieves know, pt is NPO still. And asking if she wants pt's cholesterol checked in morning, PT/OT, SLP consult for stroke protocol.     8-23-24/ 0650: pt took off tele monitor again, wife is visiting at bedside, states not to wake him up to replace tele leads.

## 2024-08-24 LAB
ALBUMIN SERPL BCP-MCNC: 3.7 G/DL (ref 3.4–5)
ANION GAP SERPL CALC-SCNC: 9 MMOL/L (ref 10–20)
BASOPHILS # BLD AUTO: 0.04 X10*3/UL (ref 0–0.1)
BASOPHILS NFR BLD AUTO: 0.1 %
BUN SERPL-MCNC: 16 MG/DL (ref 6–23)
CALCIUM SERPL-MCNC: 9.2 MG/DL (ref 8.6–10.3)
CHLORIDE SERPL-SCNC: 103 MMOL/L (ref 98–107)
CMV IGM SERPL-ACNC: <8 AU/ML
CO2 SERPL-SCNC: 28 MMOL/L (ref 21–32)
CREAT SERPL-MCNC: 1.27 MG/DL (ref 0.5–1.3)
EGFRCR SERPLBLD CKD-EPI 2021: 55 ML/MIN/1.73M*2
EOSINOPHIL # BLD AUTO: 0.19 X10*3/UL (ref 0–0.4)
EOSINOPHIL NFR BLD AUTO: 0.4 %
ERYTHROCYTE [DISTWIDTH] IN BLOOD BY AUTOMATED COUNT: 13.8 % (ref 11.5–14.5)
ERYTHROCYTE [DISTWIDTH] IN BLOOD BY AUTOMATED COUNT: 13.9 % (ref 11.5–14.5)
GLUCOSE SERPL-MCNC: 95 MG/DL (ref 74–99)
HCT VFR BLD AUTO: 43.7 % (ref 41–52)
HCT VFR BLD AUTO: 44.1 % (ref 41–52)
HGB BLD-MCNC: 13.7 G/DL (ref 13.5–17.5)
HGB BLD-MCNC: 14.3 G/DL (ref 13.5–17.5)
IMM GRANULOCYTES # BLD AUTO: 0.14 X10*3/UL (ref 0–0.5)
IMM GRANULOCYTES NFR BLD AUTO: 0.3 % (ref 0–0.9)
INR PPP: 1.1 (ref 0.9–1.1)
LYMPHOCYTES # BLD AUTO: 41.3 X10*3/UL (ref 0.8–3)
LYMPHOCYTES NFR BLD AUTO: 85.4 %
MAGNESIUM SERPL-MCNC: 1.99 MG/DL (ref 1.6–2.4)
MCH RBC QN AUTO: 29.3 PG (ref 26–34)
MCH RBC QN AUTO: 30.2 PG (ref 26–34)
MCHC RBC AUTO-ENTMCNC: 31.4 G/DL (ref 32–36)
MCHC RBC AUTO-ENTMCNC: 32.4 G/DL (ref 32–36)
MCV RBC AUTO: 93 FL (ref 80–100)
MCV RBC AUTO: 94 FL (ref 80–100)
MONOCYTES # BLD AUTO: 0.82 X10*3/UL (ref 0.05–0.8)
MONOCYTES NFR BLD AUTO: 1.7 %
NEUTROPHILS # BLD AUTO: 5.88 X10*3/UL (ref 1.6–5.5)
NEUTROPHILS NFR BLD AUTO: 12.1 %
NRBC BLD-RTO: 0 /100 WBCS (ref 0–0)
NRBC BLD-RTO: 0 /100 WBCS (ref 0–0)
PHOSPHATE SERPL-MCNC: 2.8 MG/DL (ref 2.5–4.9)
PLATELET # BLD AUTO: 161 X10*3/UL (ref 150–450)
PLATELET # BLD AUTO: 164 X10*3/UL (ref 150–450)
POTASSIUM SERPL-SCNC: 3.6 MMOL/L (ref 3.5–5.3)
PROTHROMBIN TIME: 12.7 SECONDS (ref 9.8–12.8)
RBC # BLD AUTO: 4.67 X10*6/UL (ref 4.5–5.9)
RBC # BLD AUTO: 4.73 X10*6/UL (ref 4.5–5.9)
SODIUM SERPL-SCNC: 136 MMOL/L (ref 136–145)
WBC # BLD AUTO: 45.9 X10*3/UL (ref 4.4–11.3)
WBC # BLD AUTO: 48.4 X10*3/UL (ref 4.4–11.3)

## 2024-08-24 PROCEDURE — 2500000002 HC RX 250 W HCPCS SELF ADMINISTERED DRUGS (ALT 637 FOR MEDICARE OP, ALT 636 FOR OP/ED)

## 2024-08-24 PROCEDURE — 36415 COLL VENOUS BLD VENIPUNCTURE: CPT

## 2024-08-24 PROCEDURE — 1200000002 HC GENERAL ROOM WITH TELEMETRY DAILY

## 2024-08-24 PROCEDURE — 99233 SBSQ HOSP IP/OBS HIGH 50: CPT

## 2024-08-24 PROCEDURE — 9420000001 HC RT PATIENT EDUCATION 5 MIN

## 2024-08-24 PROCEDURE — 85025 COMPLETE CBC W/AUTO DIFF WBC: CPT

## 2024-08-24 PROCEDURE — 2500000001 HC RX 250 WO HCPCS SELF ADMINISTERED DRUGS (ALT 637 FOR MEDICARE OP): Performed by: STUDENT IN AN ORGANIZED HEALTH CARE EDUCATION/TRAINING PROGRAM

## 2024-08-24 PROCEDURE — 85610 PROTHROMBIN TIME: CPT

## 2024-08-24 PROCEDURE — 80069 RENAL FUNCTION PANEL: CPT

## 2024-08-24 PROCEDURE — 2500000004 HC RX 250 GENERAL PHARMACY W/ HCPCS (ALT 636 FOR OP/ED)

## 2024-08-24 PROCEDURE — 83735 ASSAY OF MAGNESIUM: CPT

## 2024-08-24 PROCEDURE — 2500000001 HC RX 250 WO HCPCS SELF ADMINISTERED DRUGS (ALT 637 FOR MEDICARE OP)

## 2024-08-24 PROCEDURE — 85027 COMPLETE CBC AUTOMATED: CPT

## 2024-08-24 RX ORDER — ENOXAPARIN SODIUM 100 MG/ML
1 INJECTION SUBCUTANEOUS EVERY 12 HOURS
Status: DISCONTINUED | OUTPATIENT
Start: 2024-08-24 | End: 2024-08-26 | Stop reason: HOSPADM

## 2024-08-24 ASSESSMENT — PAIN SCALES - PAIN ASSESSMENT IN ADVANCED DEMENTIA (PAINAD)
FACIALEXPRESSION: SMILING OR INEXPRESSIVE
BREATHING: NORMAL
BODYLANGUAGE: RELAXED
CONSOLABILITY: NO NEED TO CONSOLE
BREATHING: NORMAL
TOTALSCORE: 0
FACIALEXPRESSION: SMILING OR INEXPRESSIVE
FACIALEXPRESSION: SMILING OR INEXPRESSIVE
TOTALSCORE: 0
BREATHING: NORMAL
CONSOLABILITY: NO NEED TO CONSOLE
BODYLANGUAGE: RELAXED
BODYLANGUAGE: RELAXED
TOTALSCORE: 0
CONSOLABILITY: NO NEED TO CONSOLE

## 2024-08-24 ASSESSMENT — COGNITIVE AND FUNCTIONAL STATUS - GENERAL
DAILY ACTIVITIY SCORE: 11
DRESSING REGULAR LOWER BODY CLOTHING: A LOT
WALKING IN HOSPITAL ROOM: TOTAL
PERSONAL GROOMING: A LOT
MOBILITY SCORE: 11
TURNING FROM BACK TO SIDE WHILE IN FLAT BAD: A LOT
CLIMB 3 TO 5 STEPS WITH RAILING: TOTAL
HELP NEEDED FOR BATHING: A LOT
DRESSING REGULAR UPPER BODY CLOTHING: A LOT
MOVING FROM LYING ON BACK TO SITTING ON SIDE OF FLAT BED WITH BEDRAILS: A LITTLE
STANDING UP FROM CHAIR USING ARMS: A LOT
TOILETING: A LOT
MOVING TO AND FROM BED TO CHAIR: A LOT
EATING MEALS: TOTAL

## 2024-08-24 NOTE — CARE PLAN
The patient's goals for the shift include  patient will remain free from pressure injuries with the use of [id number].     The clinical goals for the shift include patient will be free from falls/injury this shift

## 2024-08-24 NOTE — CONSULTS
ents name:  Room #  Do you have any home inhalers?    no  Do you get relief when using it?       [employer] education and have them teach back  If using home inhaler do you rinse your mouth?  Any barriers?  When were you diagnosed with COPD? no  Any previous PFTs?   If so, when?   explain the importance of a PFT  Do you have a pulmonary Dr.? no  Name:  Phone number:  Date of last appt:  Pulmonary cards given  Do you currently smoke or vape or have you ever?   no  Quit date or planning to quit?  How long have you smoked for?   PPD?    Smoking education given and class information given   Get orders for nicotine supplement  Do you have a Primary Dr.? yes  Name: does not remember  Phone number:  Date of last appt:  Do you have any home O2 or CPAP/BiPAP?  no  Settings for CPAP/BiPAP:  What company?                How much O2 at home?   Last time 6mwt was done?   Educate on acceptable O2 levels and the importance of monitoring O2 at home. Complete home O2 evaluation if needed.    This RT to see patient for COPD consult. The patient was given a COPD booklet with educational materials regarding pulmonary issues.     Aaron Limon, RRT

## 2024-08-24 NOTE — CARE PLAN
The patient's goals for the shift include  patient will remain free from falls by the end of shift.     The clinical goals for the shift include patient will be free from falls/injury this shift

## 2024-08-24 NOTE — PROGRESS NOTES
"  Subjective    Overnight Events: JONO    Pt seen and examined at bedside. He is resting comfortably in NAD. Pt was to be discharged yesterday but wife reports that patient appeared too weak to return home and requested PT/OT evaluation.     ROS: 12 points review of system is negative except as stated in the HPI above.     Objective    Vitals  Visit Vitals  /81   Pulse 66   Temp 36.5 °C (97.7 °F)   Resp 15   Ht 1.727 m (5' 8\")   Wt 103 kg (227 lb 8.2 oz)   SpO2 94%   BMI 34.59 kg/m²   Smoking Status Never   BSA 2.22 m²       Physical Exam    --Vital signs reviewed in nursing triage note, EMR flow sheets, and at patient's bedside  Constitutional: Appears stated age. In NAD.   HEENT: NC/AT, EOMI, clear sclera, moist mucous membranes  CV: RRR, No M/R/G  PULM: CTAB, no coughing or wheezing  ABDOMEN: Soft, NT/ND. No TTP.    EXTREMITIES: Non-Tender, Full ROM, No Pedal Edema  NEURO: A&O x 1, pt is speaking but not answering questions     IOs    Intake/Output Summary (Last 24 hours) at 8/24/2024 1341  Last data filed at 8/24/2024 0826  Gross per 24 hour   Intake 740 ml   Output 400 ml   Net 340 ml       Labs:   Results from last 72 hours   Lab Units 08/24/24  0742 08/23/24  0517 08/22/24  1256   SODIUM mmol/L 136 138 137   POTASSIUM mmol/L 3.6 3.8 3.8   CHLORIDE mmol/L 103 104 102   CO2 mmol/L 28 27 19*   BUN mg/dL 16 19 23   CREATININE mg/dL 1.27 1.18 1.38*   GLUCOSE mg/dL 95 92 193*   CALCIUM mg/dL 9.2 9.2 9.2   ANION GAP mmol/L 9* 11 20   EGFR mL/min/1.73m*2 55* 60* 50*   PHOSPHORUS mg/dL 2.8 3.2  --       Results from last 72 hours   Lab Units 08/24/24  0905 08/24/24  0742 08/23/24  0517 08/22/24  1702 08/22/24  1256   WBC AUTO x10*3/uL 45.9* 48.4* 45.4* 61.5* 57.4*   HEMOGLOBIN g/dL 14.3 13.7 13.4* 13.9 14.1   HEMATOCRIT % 44.1 43.7 42.2 43.3 43.6   PLATELETS AUTO x10*3/uL 161 164 154 141* 161   NEUTROS PCT AUTO %  --  12.1 11.9  --  11.6   LYMPHO PCT MAN %  --   --   --  78.0  --    LYMPHS PCT AUTO %  --  85.4 " "85.8  --  87.0   MONO PCT MAN %  --   --   --  1.0  --    MONOS PCT AUTO %  --  1.7 1.6  --  0.5   EOSINO PCT MAN %  --   --   --  0.0  --    EOS PCT AUTO %  --  0.4 0.3  --  0.4      Lab Results   Component Value Date    CALCIUM 9.2 08/24/2024    PHOS 2.8 08/24/2024      Lab Results   Component Value Date    CRP 0.82 08/22/2024      [unfilled]     Micro/ID:   Susceptibility data from last 90 days.  Collected Specimen Info Organism   08/22/24 Blood culture from Peripheral Venipuncture Staphylococcus epidermidis     Results from last 7 days   Lab Units 08/22/24  1844   CMV IGG  Nonreactive                No lab exists for component: \"AGALPCRNB\"   .ID  Lab Results   Component Value Date    BLOODCULT No growth at 1 day 08/22/2024       Meds  Scheduled medications  amantadine, 100 mg, oral, q12h MILEY  citalopram, 20 mg, oral, Daily  levETIRAcetam, 1,000 mg, oral, q12h MILEY  melatonin, 5 mg, oral, Nightly  metoprolol tartrate, 25 mg, oral, Daily  polyethylene glycol, 17 g, oral, Daily  polyethylene glycol, 17 g, oral, Daily  sennosides-docusate sodium, 2 tablet, oral, BID  warfarin, 6 mg, oral, Daily      Continuous medications     PRN medications  PRN medications: acetaminophen **OR** acetaminophen **OR** acetaminophen, magnesium hydroxide, ondansetron **OR** ondansetron     Assessment and Plan     Arturo Dale is a 84 y.o. male admitted on 8/22/2024 with history of 2 strokes, most recent in January 2024, seizure disorder, leukemia, vascular dementia, HLD, and HTN presenting for seizure.     ACUTE MEDICAL ISSUES:  # Seizure disorder  - Continue Keppra 1000 BID    #Weakness  - PT/OT ordered     #ALDO, likely prerenal, resolved  -Creatinine of 1.38 on admission  - s/p 2L  -Morning labs  -Monitor I + O     CHRONIC MEDICAL ISSUES:  #HLD- Continue home Lipitor  #HTN- Continue home metoprolol  #History of CVA- Continue home Warfarin     Fluids: Received 2L LR bolus total  Electrolytes: PRN  Nutrition: NPO, pending " bedside swallow  Antimicrobials: None currently  Lines: PIV  Supplemental Oxygen: RA  Emergency Contact: Extended Emergency Contact Information  Primary Emergency Contact: ChitoPatti perry  Mobile Phone: [phone]  Relation: Spouse  Preferred language: English   needed? No   Code: Full Code      Disposition: Discharge pending PT/OT evaluation

## 2024-08-25 VITALS
DIASTOLIC BLOOD PRESSURE: 85 MMHG | HEART RATE: 68 BPM | OXYGEN SATURATION: 95 % | WEIGHT: 227.51 LBS | BODY MASS INDEX: 34.48 KG/M2 | TEMPERATURE: 97.7 F | RESPIRATION RATE: 16 BRPM | SYSTOLIC BLOOD PRESSURE: 131 MMHG | HEIGHT: 68 IN

## 2024-08-25 LAB
ALBUMIN SERPL BCP-MCNC: 3.6 G/DL (ref 3.4–5)
ANION GAP SERPL CALC-SCNC: 14 MMOL/L (ref 10–20)
BACTERIA BLD AEROBE CULT: ABNORMAL
BACTERIA BLD CULT: ABNORMAL
BACTERIA BLD CULT: NORMAL
BASOPHILS # BLD MANUAL: 0 X10*3/UL (ref 0–0.1)
BASOPHILS NFR BLD MANUAL: 0 %
BUN SERPL-MCNC: 19 MG/DL (ref 6–23)
CALCIUM SERPL-MCNC: 9.1 MG/DL (ref 8.6–10.3)
CHLORIDE SERPL-SCNC: 101 MMOL/L (ref 98–107)
CO2 SERPL-SCNC: 28 MMOL/L (ref 21–32)
CREAT SERPL-MCNC: 1.3 MG/DL (ref 0.5–1.3)
EGFRCR SERPLBLD CKD-EPI 2021: 54 ML/MIN/1.73M*2
EOSINOPHIL # BLD MANUAL: 0 X10*3/UL (ref 0–0.4)
EOSINOPHIL NFR BLD MANUAL: 0 %
ERYTHROCYTE [DISTWIDTH] IN BLOOD BY AUTOMATED COUNT: 13.9 % (ref 11.5–14.5)
GLUCOSE SERPL-MCNC: 102 MG/DL (ref 74–99)
GRAM STN SPEC: ABNORMAL
GRAM STN SPEC: ABNORMAL
HCT VFR BLD AUTO: 43 % (ref 41–52)
HGB BLD-MCNC: 13.9 G/DL (ref 13.5–17.5)
IMM GRANULOCYTES # BLD AUTO: 0.16 X10*3/UL (ref 0–0.5)
IMM GRANULOCYTES NFR BLD AUTO: 0.3 % (ref 0–0.9)
INR PPP: 1.2 (ref 0.9–1.1)
LYMPHOCYTES # BLD MANUAL: 38.31 X10*3/UL (ref 0.8–3)
LYMPHOCYTES NFR BLD MANUAL: 81 %
MAGNESIUM SERPL-MCNC: 1.93 MG/DL (ref 1.6–2.4)
MCH RBC QN AUTO: 30.1 PG (ref 26–34)
MCHC RBC AUTO-ENTMCNC: 32.3 G/DL (ref 32–36)
MCV RBC AUTO: 93 FL (ref 80–100)
MONOCYTES # BLD MANUAL: 1.89 X10*3/UL (ref 0.05–0.8)
MONOCYTES NFR BLD MANUAL: 4 %
NEUTS SEG # BLD MANUAL: 6.15 X10*3/UL (ref 1.6–5)
NEUTS SEG NFR BLD MANUAL: 13 %
NRBC BLD-RTO: 0 /100 WBCS (ref 0–0)
PHOSPHATE SERPL-MCNC: 2.8 MG/DL (ref 2.5–4.9)
PLATELET # BLD AUTO: 160 X10*3/UL (ref 150–450)
POTASSIUM SERPL-SCNC: 3.9 MMOL/L (ref 3.5–5.3)
PROTHROMBIN TIME: 13.7 SECONDS (ref 9.8–12.8)
RBC # BLD AUTO: 4.62 X10*6/UL (ref 4.5–5.9)
RBC MORPH BLD: ABNORMAL
SODIUM SERPL-SCNC: 139 MMOL/L (ref 136–145)
TOTAL CELLS COUNTED BLD: 100
VARIANT LYMPHS # BLD MANUAL: 0.95 X10*3/UL (ref 0–0.3)
VARIANT LYMPHS NFR BLD: 2 %
WBC # BLD AUTO: 47.3 X10*3/UL (ref 4.4–11.3)
WNV IGG SER IA-ACNC: 0.06 IV
WNV IGM SER IA-ACNC: 0 IV

## 2024-08-25 PROCEDURE — 97163 PT EVAL HIGH COMPLEX 45 MIN: CPT | Mod: GP

## 2024-08-25 PROCEDURE — 85610 PROTHROMBIN TIME: CPT

## 2024-08-25 PROCEDURE — 2500000002 HC RX 250 W HCPCS SELF ADMINISTERED DRUGS (ALT 637 FOR MEDICARE OP, ALT 636 FOR OP/ED): Performed by: STUDENT IN AN ORGANIZED HEALTH CARE EDUCATION/TRAINING PROGRAM

## 2024-08-25 PROCEDURE — 1200000002 HC GENERAL ROOM WITH TELEMETRY DAILY

## 2024-08-25 PROCEDURE — 2500000001 HC RX 250 WO HCPCS SELF ADMINISTERED DRUGS (ALT 637 FOR MEDICARE OP): Performed by: STUDENT IN AN ORGANIZED HEALTH CARE EDUCATION/TRAINING PROGRAM

## 2024-08-25 PROCEDURE — 2500000001 HC RX 250 WO HCPCS SELF ADMINISTERED DRUGS (ALT 637 FOR MEDICARE OP)

## 2024-08-25 PROCEDURE — 97165 OT EVAL LOW COMPLEX 30 MIN: CPT | Mod: GO | Performed by: OCCUPATIONAL THERAPIST

## 2024-08-25 PROCEDURE — 97530 THERAPEUTIC ACTIVITIES: CPT | Mod: GO | Performed by: OCCUPATIONAL THERAPIST

## 2024-08-25 PROCEDURE — 36415 COLL VENOUS BLD VENIPUNCTURE: CPT

## 2024-08-25 PROCEDURE — 2500000004 HC RX 250 GENERAL PHARMACY W/ HCPCS (ALT 636 FOR OP/ED)

## 2024-08-25 PROCEDURE — 83735 ASSAY OF MAGNESIUM: CPT

## 2024-08-25 PROCEDURE — 85027 COMPLETE CBC AUTOMATED: CPT

## 2024-08-25 PROCEDURE — 99233 SBSQ HOSP IP/OBS HIGH 50: CPT

## 2024-08-25 PROCEDURE — 85007 BL SMEAR W/DIFF WBC COUNT: CPT

## 2024-08-25 PROCEDURE — 80069 RENAL FUNCTION PANEL: CPT

## 2024-08-25 RX ORDER — WARFARIN SODIUM 5 MG/1
7.5 TABLET ORAL DAILY
Status: DISCONTINUED | OUTPATIENT
Start: 2024-08-25 | End: 2024-08-26

## 2024-08-25 ASSESSMENT — COGNITIVE AND FUNCTIONAL STATUS - GENERAL
EATING MEALS: TOTAL
WALKING IN HOSPITAL ROOM: TOTAL
HELP NEEDED FOR BATHING: A LOT
PERSONAL GROOMING: A LOT
TURNING FROM BACK TO SIDE WHILE IN FLAT BAD: A LOT
MOVING FROM LYING ON BACK TO SITTING ON SIDE OF FLAT BED WITH BEDRAILS: A LITTLE
HELP NEEDED FOR BATHING: A LOT
MOVING FROM LYING ON BACK TO SITTING ON SIDE OF FLAT BED WITH BEDRAILS: A LITTLE
HELP NEEDED FOR BATHING: A LOT
CLIMB 3 TO 5 STEPS WITH RAILING: TOTAL
STANDING UP FROM CHAIR USING ARMS: A LOT
DAILY ACTIVITIY SCORE: 11
TOILETING: A LOT
STANDING UP FROM CHAIR USING ARMS: A LOT
EATING MEALS: A LOT
MOBILITY SCORE: 7
DAILY ACTIVITIY SCORE: 11
WALKING IN HOSPITAL ROOM: TOTAL
DRESSING REGULAR LOWER BODY CLOTHING: TOTAL
CLIMB 3 TO 5 STEPS WITH RAILING: TOTAL
DRESSING REGULAR LOWER BODY CLOTHING: A LOT
MOVING TO AND FROM BED TO CHAIR: TOTAL
PERSONAL GROOMING: A LOT
DRESSING REGULAR LOWER BODY CLOTHING: A LOT
STANDING UP FROM CHAIR USING ARMS: A LOT
DRESSING REGULAR UPPER BODY CLOTHING: A LOT
TOILETING: A LOT
DRESSING REGULAR UPPER BODY CLOTHING: A LOT
TOILETING: TOTAL
MOBILITY SCORE: 11
PERSONAL GROOMING: A LOT
TURNING FROM BACK TO SIDE WHILE IN FLAT BAD: TOTAL
MOVING FROM LYING ON BACK TO SITTING ON SIDE OF FLAT BED WITH BEDRAILS: TOTAL
WALKING IN HOSPITAL ROOM: TOTAL
MOBILITY SCORE: 11
MOVING TO AND FROM BED TO CHAIR: A LOT
DRESSING REGULAR UPPER BODY CLOTHING: A LOT
TURNING FROM BACK TO SIDE WHILE IN FLAT BAD: A LOT
EATING MEALS: TOTAL
MOVING TO AND FROM BED TO CHAIR: A LOT
DAILY ACTIVITIY SCORE: 10
CLIMB 3 TO 5 STEPS WITH RAILING: TOTAL

## 2024-08-25 ASSESSMENT — PAIN SCALES - GENERAL
PAINLEVEL_OUTOF10: 0 - NO PAIN

## 2024-08-25 ASSESSMENT — PAIN SCALES - PAIN ASSESSMENT IN ADVANCED DEMENTIA (PAINAD)
FACIALEXPRESSION: SMILING OR INEXPRESSIVE
FACIALEXPRESSION: SMILING OR INEXPRESSIVE
BREATHING: NORMAL
BODYLANGUAGE: RELAXED
BODYLANGUAGE: RELAXED
CONSOLABILITY: NO NEED TO CONSOLE
TOTALSCORE: 0
TOTALSCORE: 0
CONSOLABILITY: NO NEED TO CONSOLE
BREATHING: NORMAL

## 2024-08-25 ASSESSMENT — ACTIVITIES OF DAILY LIVING (ADL): BATHING_ASSISTANCE: MAXIMAL

## 2024-08-25 ASSESSMENT — PAIN - FUNCTIONAL ASSESSMENT
PAIN_FUNCTIONAL_ASSESSMENT: 0-10
PAIN_FUNCTIONAL_ASSESSMENT: 0-10

## 2024-08-25 NOTE — PROGRESS NOTES
Internal Medicine - Daily Progress Note   Hospital Day: 4       Name:Arturo Dale, AGE: 84 y.o., GENDER: male, MRN: [id number], ROOM: 119/119-A   CODE STATUS: Full Code  Attending Physician: Mateo August MD  Resident: Efrain Parker DO        Chief Complaint     Chief Complaint   Patient presents with    Altered Mental Status        Subjective    Arturo Dale is a 84 y.o. year old male patient on Hospital Day: 4. The patient was more alert and aware today, however he remains non-verbal. His wife was present at bedside and believes that the patient could go home if he was able to ambulate better. Patient continues to have no signs of infection such as fever, nausea, vomiting, or diarrhea.   Overnight events:  NAOE    Meds    Scheduled medications  amantadine, 100 mg, oral, q12h MILEY  citalopram, 20 mg, oral, Daily  enoxaparin, 1 mg/kg, subcutaneous, q12h  levETIRAcetam, 1,000 mg, oral, q12h MILEY  melatonin, 5 mg, oral, Nightly  metoprolol tartrate, 25 mg, oral, Daily  polyethylene glycol, 17 g, oral, Daily  polyethylene glycol, 17 g, oral, Daily  sennosides-docusate sodium, 2 tablet, oral, BID  warfarin, 6 mg, oral, Daily      Continuous medications     PRN medications  PRN medications: acetaminophen **OR** acetaminophen **OR** acetaminophen, magnesium hydroxide, ondansetron **OR** ondansetron     Objective    Physical Exam  Constitutional:       General: He is not in acute distress.     Appearance: He is not ill-appearing.   Eyes:      General:         Right eye: No discharge.         Left eye: No discharge.      Extraocular Movements: Extraocular movements intact.      Conjunctiva/sclera: Conjunctivae normal.   Cardiovascular:      Rate and Rhythm: Normal rate and regular rhythm.      Pulses: Normal pulses.      Heart sounds: Normal heart sounds. No murmur heard.     No gallop.   Pulmonary:      Effort: Pulmonary effort is normal. No respiratory distress.      Breath sounds: Normal breath sounds. No  "wheezing or rales.   Abdominal:      General: Abdomen is flat. There is no distension.      Palpations: Abdomen is soft.      Tenderness: There is no abdominal tenderness. There is no guarding or rebound.   Neurological:      Mental Status: He is disoriented.      Sensory: No sensory deficit.      Motor: No weakness.      Comments: Unable to perform full neuro exam        Visit Vitals  /86 (BP Location: Right arm, Patient Position: Lying)   Pulse 62   Temp 36.5 °C (97.7 °F) (Temporal)   Resp 16   Ht 1.727 m (5' 8\")   Wt 103 kg (227 lb 8.2 oz)   SpO2 96%   BMI 34.59 kg/m²   Smoking Status Never   BSA 2.22 m²        Intake/Output Summary (Last 24 hours) at 8/25/2024 1444  Last data filed at 8/25/2024 1213  Gross per 24 hour   Intake 440 ml   Output 1550 ml   Net -1110 ml       Labs:   Results from last 72 hours   Lab Units 08/25/24  0545 08/24/24  0742 08/23/24  0517   SODIUM mmol/L 139 136 138   POTASSIUM mmol/L 3.9 3.6 3.8   CHLORIDE mmol/L 101 103 104   CO2 mmol/L 28 28 27   BUN mg/dL 19 16 19   CREATININE mg/dL 1.30 1.27 1.18   GLUCOSE mg/dL 102* 95 92   CALCIUM mg/dL 9.1 9.2 9.2   ANION GAP mmol/L 14 9* 11   EGFR mL/min/1.73m*2 54* 55* 60*   PHOSPHORUS mg/dL 2.8 2.8 3.2      Results from last 72 hours   Lab Units 08/25/24  0545 08/24/24  0905 08/24/24  0742 08/23/24  0517   WBC AUTO x10*3/uL 47.3* 45.9* 48.4* 45.4*   HEMOGLOBIN g/dL 13.9 14.3 13.7 13.4*   HEMATOCRIT % 43.0 44.1 43.7 42.2   PLATELETS AUTO x10*3/uL 160 161 164 154   NEUTROS PCT AUTO %  --   --  12.1 11.9   LYMPHO PCT MAN % 81.0  --   --   --    LYMPHS PCT AUTO %  --   --  85.4 85.8   MONO PCT MAN % 4.0  --   --   --    MONOS PCT AUTO %  --   --  1.7 1.6   EOSINO PCT MAN % 0.0  --   --   --    EOS PCT AUTO %  --   --  0.4 0.3      Lab Results   Component Value Date    CALCIUM 9.1 08/25/2024    PHOS 2.8 08/25/2024      Lab Results   Component Value Date    CRP 0.82 08/22/2024       [unfilled]     Micro/ID:   Susceptibility data from last " "90 days.  Collected Specimen Info Organism   08/22/24 Blood culture from Peripheral Venipuncture Staphylococcus epidermidis     Results from last 7 days   Lab Units 08/22/24  1844   CMV IGG  Nonreactive                No lab exists for component: \"AGALPCRNB\"     Lab Results   Component Value Date    BLOODCULT No growth at 2 days 08/22/2024       Images    EEG    Result Date: 8/23/2024  IMPRESSION Impression This routine EEG is consistent with left hemispheric structural lesion and a moderate diffuse encephalopathy. No epileptiform discharges are recorded. A full report will be scanned into the patient's chart at a later time. This report has been interpreted and electronically signed by    ECG 12 lead    Result Date: 8/23/2024  Normal sinus rhythm Right bundle branch block Left anterior fascicular block  Bifascicular block  Abnormal ECG No previous ECGs available    CT abdomen pelvis wo IV contrast    Result Date: 8/22/2024  Interpreted By:  Jennifer Mcdonald, STUDY: CT ABDOMEN PELVIS WO IV CONTRAST;  8/22/2024 8:12 pm   INDICATION: Signs/Symptoms:c/f abd abscess.   COMPARISON: None   ACCESSION NUMBER(S): [id number]   ORDERING CLINICIAN: ALEXA HERNANDEZ   TECHNIQUE: CT of the abdomen and pelvis was performed. Contiguous axial images were obtained at 3 mm slice thickness through the abdomen and pelvis. Coronal and sagittal reconstructions at 3 mm slice thickness were performed.  No intravenous or oral contrast agents were administered.   FINDINGS: Please note that the evaluation of vessels, lymph nodes and organs is limited without intravenous contrast. Additionally, the study is somewhat degraded by motion.   LOWER CHEST: Atelectatic changes are present in the lower lobes bilaterally without evidence of consolidation or pleural effusion. Subpleural calcifications are present in the inferior aspect of the right middle and left upper lobe and in the lower lobes bilaterally.   Heart is normal in size without " pericardial effusion.   Distal esophagus is unremarkable in appearance.   ABDOMEN:   LIVER: Within limits of motion and noncontrast exam no acute hepatic abnormality is present.   BILE DUCTS: No intrahepatic or extrahepatic biliary dilatation is present.   GALLBLADDER: Layering hyperdense material is present in the gallbladder, likely representing sludge or stones, without significant thickening of the gallbladder wall or surrounding pericholecystic stranding.   PANCREAS: No pancreatic ductal dilatation or peripancreatic stranding is present.   SPLEEN: Spleen is unremarkable in appearance.   ADRENAL GLANDS: Bilateral adrenal glands are unremarkable in appearance.   KIDNEYS AND URETERS: Left kidney is asymmetrically smaller in size compared to the contralateral right, likely due to chronic ischemia. No hydronephrosis is identified bilaterally. 4.5 cm exophytic cyst arises from the right kidney.   No hydronephrosis is present bilaterally. Visualized upper ureters are unremarkable in appearance.   PELVIS:   BLADDER: Bladder is opacify with positive oral contrast. No bladder wall thickening is present, although small bladder diverticulum is noted.   REPRODUCTIVE ORGANS: Prostate is slightly enlarged.   BOWEL: Stomach is unremarkable in appearance. No abnormal small bowel dilatation is present. Appendix is not well visualized, although no inflammatory changes are present in the its expected location in the right lower quadrant. Scattered diverticula are present in the descending and sigmoid colon without evidence of acute diverticulitis.   Large volume of solid stool is present in the rectum.   VESSELS: Atherosclerotic plaques are present in the abdominal aorta without evidence of acute vascular abnormality.   PERITONEUM/RETROPERITONEUM/LYMPH NODES: There is no evidence of free air, free fluid, or thick-walled collections in the abdomen or pelvis. No enlarged lymphadenopathy is identified.   ABDOMINAL WALL: Cutaneous  tissues of the abdomen and pelvis do not demonstrate any acute abnormality.   BONES: No acute osseous abnormality is identified. Please refer to separately dictated same day CT of the lumbar spine for further characterization of spinal findings.       1.  Within limits of motion degraded, noncontrast exam, no definite evidence of acute abnormality is identified in the abdomen or pelvis. 2. Layering hyperdense material is present in the gallbladder, likely representing sludge, without evidence of wall thickening or pericholecystic stranding. Right upper quadrant ultrasound can be considered for better characterization. 3. Left kidney is asymmetrically smaller in size compared to the contralateral right, likely due to chronic ischemia, without evidence of hydronephrosis bilaterally. Punctate non occluding stone is present in the lower pole of the right kidney. 4. Scattered diverticula are present in the descending and sigmoid colon without evidence of acute diverticulitis. Large volume of solid stool is present in the rectum.     MACRO: None   Signed by: Jennifer Mcdonald 8/22/2024 8:33 PM Dictation workstation:   [id number]    CT lumbar spine wo IV contrast    Result Date: 8/22/2024  Interpreted By:  Jennifer Mcdonald, STUDY: CT LUMBAR SPINE WO IV CONTRAST  8/22/2024 6:10 pm   INDICATION: Signs/Symptoms:c/f abscess   COMPARISON: None.   ACCESSION NUMBER(S): [id number]   ORDERING CLINICIAN: ALEXA HERNANDEZ   TECHNIQUE: Axial CT images of the lumbar spine are obtained. Axial, coronal and sagittal reconstructions are provided for review.   FINDINGS: There are 5 lumbar type non rib-bearing vertebral bodies, with the lowest well-formed intervertebral disc space labeled L5-S1.   Postsurgical changes consistent with discectomies and intervertebral disc grafts at L3-L4 and L4-L5 with posterior spinal fusion with pedicle screws and interlocking rods extending from the level of L3-L4. Associated beam hardening artifact  slightly limits evaluation of the adjacent structures. Within limits of the exam there is no evidence of perihardware lucency or fracture.   There is a 8-9 mm anterolisthesis of L4 on L5, with bony ankylosis of L3-L4 and L4-L5. Lumbar vertebral alignment of the levels is maintained.   Lumbar vertebral body heights are preserved without evidence of compression fractures.   There is no evidence of acute trauma to the posterior elements of the lumbar spine. Multilevel degenerate facet osteoarthropathy thrombosis is present, most pronounced at L4-L5 and L5-S1 on the right.   Subtle spinal canal stenosis is present, with at least moderate narrowing suspected at the levels of L2-L3 due to disc osteophyte complex and ligamentum flavum thickening and L3-L4 due to disc osteophyte complex and ossification of the posterior longitudinal ligament.   At least moderate stenosis is also present at the level of L4-L5 due to spondylolisthesis, hypertrophic facet changes and disc osteophyte complex.   Multilevel neural foraminal stenosis is present, with moderate to severe narrowing suspected at the level of L2-L3 due to hypertrophic facet changes, and at least moderate narrowing present at L3-L4 and L4-L5.   Prevertebral and paraspinal soft tissues do not demonstrate any acute abnormality.       1.  No evidence of acute process. Paraspinal soft tissues are unremarkable in appearance without evidence of fluid collections or soft tissue gas. 2. Degenerate present lumbar spine, with at least moderate stenosis suspected at the levels of L2-L3, L3-L4 and L4-L5 due to combination of disc osteophyte complexes, ligamentum flavum thickening, hypertrophic facet changes and spondylolisthesis at L4-L5. 3. Postsurgical changes of the lumbar spine consistent with discectomies and intervertebral disc space series L3-L4 and L4-L5 and posterior spinal fusion with pedicle screws and interlocking rods at L3-L4.   MACRO: None   Signed by: Jennifer  Gerasymchuk 8/22/2024 6:29 PM Dictation workstation:   [id number]    XR chest 1 view    Result Date: 8/22/2024  Interpreted By:  Vitaliy Dumont, STUDY: XR CHEST 1 VIEW;  8/22/2024 5:15 pm   INDICATION: Signs/Symptoms:eval PNA.   COMPARISON: None.   ACCESSION NUMBER(S): [id number]   ORDERING CLINICIAN: ANNEMARIE TREADWELL   FINDINGS:     CARDIOMEDIASTINAL SILHOUETTE: Cardiomediastinal silhouette is normal in size and configuration.   LUNGS: No consolidation, pneumothorax, or significant effusion. Mild bilateral reticular changes. Calcified pleural plaque along the right likely rib likely in prior specific exposure.   ABDOMEN: No remarkable upper abdominal findings.   BONES: No acute osseous changes.       1.  No evidence of acute cardiopulmonary process.     Signed by: Vitaliy Dumont 8/22/2024 5:19 PM Dictation workstation:   [id number]    CT brain attack angio head and neck W and WO IV contrast    Result Date: 8/22/2024  Interpreted By:  Eduar Rosario, STUDY: CT BRAIN ATTACK ANGIO HEAD AND NECK W AND WO IV CONTRAST;  8/22/2024 1:08 pm   INDICATION: Signs/Symptoms:facial droop with aphasia. concern for bleed   COMPARISON: 08/22/2024 CT head without contrast   ACCESSION NUMBER(S): [id number]   ORDERING CLINICIAN: DEREK REY   TECHNIQUE: Following IV contrast administration of iodinated contrast, a CT angiography of the head and neck was performed. MIPS and 3D reconstructions of the [place] and neck were created on an independent workstation and reviewed.   FINDINGS: There are calcified pleural plaques.   CTA NECK:       LEFT VERTEBRAL ARTERY:  No hemodynamically significant stenosis, occlusion, or dissection.   LEFT COMMON/INTERNAL CAROTID ARTERY: Minimal calcific atherosclerosis of the distal CCA and left carotid bulb. No hemodynamically significant stenosis, occlusion, or dissection.   RIGHT VERTEBRAL ARTERY:  No hemodynamically significant stenosis, occlusion, or dissection.   RIGHT  COMMON/INTERNAL CAROTID ARTERY: Minimal calcific atherosclerosis of the carotid bulb. No hemodynamically significant stenosis, occlusion, or dissection.     The neck soft tissues show no evidence of mass, fluid collection, or enlarged lymph nodes. Multinodular goiter. There is no acute osseous abnormality. Multilevel degenerative disc disease with disc spur complexes resulting in moderate C3-C4 canal stenosis, mild to moderate C5-C6 canal stenosis and mild C6-7 canal stenosis. Multilevel osteophytic neural foraminal stenosis notable for moderate-severe right C2-C3 foraminal stenosis, severe right C3-C4 foraminal stenosis, moderate severe left C4-C5 foraminal stenosis, severe right and moderate left C5-C6 foraminal stenosis and moderate C6-C7 foraminal silhouette       CTA HEAD:   ANTERIOR CIRCULATION: No aneurysm.   - Internal Carotid Arteries: Mild calcific atherosclerosis bilaterally. No hemodynamically significant stenosis or occlusion.   - Middle Cerebral Arteries: There is a paucity of left M4 vessels in the region the chronic infarct of the inferior MCA division; otherwise, no hemodynamically significant stenosis or occlusion.   - Anterior Cerebral Arteries:  No hemodynamically significant stenosis or occlusion.     POSTERIOR CIRCULATION: No aneurysm.   - Intracranial Vertebral Arteries: There is calcific atherosclerosis of the bilateral intracranial vertebral arteries without significant stenosis.   - Basilar Artery:  No hemodynamically significant stenosis or occlusion.   - Posterior Cerebral Arteries: There is fetal origin of the left posterior cerebral artery. No hemodynamically significant stenosis or occlusion.   No arteriovenous malformation is visualized. No pathologic intracranial enhancement or discrete mass. The dural venous sinuses are patent.   MIPS and 3D reconstructions confirm the above findings.       No hemodynamically significant intracranial or extracranial stenosis, large vessel occlusion,  or aneurysm. Specifically, no evidence of right-sided occlusion to explain left-sided facial droop.   Scattered non hemodynamically significant atherosclerosis as described above.   Calcified bilateral pleural plaques likely related to prior asbestos exposure.   MACRO: None.   Signed by: Eduar Rosario 8/22/2024 1:34 PM Dictation workstation:   [id number]    CT brain attack head wo IV contrast    Result Date: 8/22/2024  Interpreted By:  Dane Perkins, STUDY: CT BRAIN ATTACK HEAD WO IV CONTRAST;  8/22/2024 1:04 pm   INDICATION: Signs/Symptoms:Stroke Evaluation.   COMPARISON: None.   ACCESSION NUMBER(S): [id number]   ORDERING CLINICIAN: DEREK REY   TECHNIQUE: Noncontrast axial CT scan of head was performed. Angled reformats in brain and bone windows were generated. The images were reviewed in bone, brain, blood and soft tissue windows.   FINDINGS: CSF Spaces: The ventricles, sulci and basal cisterns are prominent. There is no extraaxial fluid collection.   Parenchyma: Moderate cerebral volume loss. Right frontal and left temporoparietal CSF density areas likely related to prior infarcts. Bilateral periventricular white matter ill-defined hypodensities likely sequelae of chronic microvascular ischemia. No serious brain herniation or shift of midline structures. No major intracranial hemorrhage.   Calvarium: The calvarium is unremarkable.   Paranasal sinuses and mastoids: Visualized paranasal sinuses and mastoids are clear.       1. No acute intracranial hemorrhage or serious brain herniation 2. Right frontal and left temporoparietal CSF density areas/encephalomalacia likely sequelae of prior infarcts. 3. Moderate cerebral volume loss as well as sequelae of chronic microvascular ischemia.   If continued clinical concern for acute neurological deficit, advise further assessment by dedicated brain MRI.   MACRO: None   Signed by: Dane Perkins 8/22/2024 1:14 PM Dictation workstation:    [id number]      Assessment and Plan    Arturo Dale is a 84 y.o. male admitted on 8/22/2024 with history of 2 strokes, most recent in January 2024, seizure disorder, leukemia, vascular dementia, HLD, and HTN presenting for seizure.     ACUTE MEDICAL ISSUES:  # Seizure disorder  - Sister-in law witnessed patient have a generalized tonic-clonic seizure on the day of admission, reportedly lasting greater than 5 minutes  - Last seizure was 5 years ago  - No current signs or sources of infection  - Vitals have remained stable during hospital stay, afebrile  - Patient is more alert, non-verbal, appears to be at baseline.  - No further seizure during hospital stay  Plan:  - Continue Keppra 1000 BID    #Decreased INR  #History of CVA  -Patient's wife reportedly checks his INR at home, typically it is around 3.0  -INR has been 1.1-1.2 during his hospital stay  -Unlikely to be due to elevated Keppra dose  -Possibly secondary to history of CLL  Plan:  -Increased warfarin dose  -Initiated additional dose of enoxaparin q12h    #Weakness  -Wife reports patient to have an increased ability to ambulate at home and would like him reevaluated by PT/OT  - PT/OT - moderate level of intensity of care  Resolved Medical Issue:    #ALDO, likely prerenal, resolved  -Creatinine of 1.38 on admission  - s/p 2L  -Morning labs  -Monitor I + O     CHRONIC MEDICAL ISSUES:  #HLD- Continue home Lipitor  #HTN- Continue home metoprolol    Fluids: Received 2L LR bolus total  Electrolytes: PRN  Nutrition: NPO, pending bedside swallow  Antimicrobials: None currently  Lines: PIV  Supplemental Oxygen: RA  Emergency Contact: Extended Emergency Contact Information  Primary Emergency Contact: Patti Dale  Mobile Phone: [phone]  Relation: Spouse  Preferred language: English   needed? No   Code: Full Code     Disposition: Pending SNF placement      Efrain Parker DO  Internal Medicine, PGY- 1  08/25/24 at 2:44 PM

## 2024-08-25 NOTE — PROGRESS NOTES
Physical Therapy    Physical Therapy Evaluation    Patient Name: Arturo Dale  MRN: [id number]  Today's Date: 8/25/2024   Time Calculation  Start Time: 0934  Stop Time: 1016  Time Calculation (min): 42 min    Assessment/Plan   PT Assessment  PT Assessment Results: Decreased mobility, Decreased cognition, Impaired judgement, Obesity (deconditioning)  Rehab Prognosis: Good  Evaluation/Treatment Tolerance: Patient limited by fatigue (deconditioning)  Medical Staff Made Aware: Yes  Barriers to Participation: Comorbidities  End of Session Communication: Bedside nurse  End of Session Patient Position: Bed, 3 rail up, Alarm on (Pt's spouse is present with him)  IP OR SWING BED PT PLAN  Inpatient or Swing Bed: Inpatient  PT Plan  Treatment/Interventions: Bed mobility, Transfer training, Gait training, Strengthening, Therapeutic exercise  PT Plan: Ongoing PT  PT Frequency: 3 times per week  PT Discharge Recommendations: Moderate intensity level of continued care  Equipment Recommended upon Discharge: Wheeled walker  PT Recommended Transfer Status: Assist x1  PT - OK to Discharge: Yes (Per PT POC)      Subjective   General Visit Information:  General  Reason for Referral:  (83 yo male admitted 2' to AMS and break through seizure)  Referred By:  (Dr. 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 he was able to process commands 30-40% Pt ws able to stand 3 x but unable to take any steps. Based on his current status, recommend Pt have MODERATE  follow up services)  Home Living:  Home Living  Type of Home: House  Lives With: Spouse  Home Adaptive Equipment: Walker rolling or standard, Wheelchair-manual  Home Layout: One level  Home Access: Ramped entrance  Bathroom Shower/Tub: Walk-in shower  Bathroom Toilet: Standard  Bathroom Equipment: Shower chair with back  Bathroom Accessibility:  (fully accessible)  Prior Level of Function:  Prior Function Per Pt/Caregiver Report  Level of [place]: Needs assistance with ADLs, Needs assistance with homemaking, Needs assistance with functional transfers  Precautions:  Precautions  Medical Precautions: Fall precautions  Vital Signs:       Objective   Pain:  Pain Assessment  Pain Assessment: 0-10  0-10 (Numeric) Pain Score: 0 - No pain  Cognition:  Cognition  Overall Cognitive Status: Impaired at baseline  Orientation Level: Disoriented X4    General Assessments:  General Observation  General Observation:  (Pt requires substantial assist for mobility. He is aphasic. Unable to advance B LE to perform gait pattern)               Activity Tolerance  Endurance: Tolerates less than 10 min exercise, no significant change in vital signs         Strength  Strength Comments:  (B LE are grossly 4/4+ of 5)  Static Sitting Balance  Static Sitting-Balance Support: Bilateral upper extremity supported  Static Sitting-Level of Assistance: Contact guard, Minimum assistance    Static Standing Balance  Static Standing-Balance Support: Bilateral upper extremity supported (wheeled walker)  Static Standing-Level of Assistance: Minimum assistance, Moderate assistance  Functional Assessments:  Bed Mobility  Bed Mobility: Yes  Bed Mobility 1  Bed Mobility 1: Supine to sitting, Sitting to supine  Level of Assistance 1: Moderate assistance (x 2)  Bed Mobility Comments 1:  (assist with trunk and B LE)    Transfers  Transfer: Yes  Transfer 1  Transfer From 1: Bed to  Transfer to 1: Stand  Technique 1: Sit to stand, Stand to sit  Transfer Device  1:  (Wheeled walker)  Transfer Level of Assistance 1: Minimum assistance, Moderate assistance    Ambulation/Gait Training  Ambulation/Gait Training Performed: No (Pt is not able to move his feet in order to step)    Stairs  Stairs: No  Extremity/Trunk Assessments:  RLE   RLE : Within Functional Limits  LLE   LLE : Within Functional Limits  Outcome Measures:  [facility] Basic Mobility  Turning from your back to your side while in a flat bed without using bedrails: Total  Moving from lying on your back to sitting on the side of a flat bed without using bedrails: Total  Moving to and from bed to chair (including a wheelchair): Total  Standing up from a chair using your arms (e.g. wheelchair or bedside chair): A lot  To walk in hospital room: Total  Climbing 3-5 steps with railing: Total  Basic Mobility - Total Score: 7    Encounter Problems       Encounter Problems (Active)       Mobility       STG - Patient will ambulate 30-50 feet with wheeled walker minimum assist (Progressing)       Start:  08/25/24    Expected End:  09/08/24               PT Transfers       STG - Patient to transfer to and from sit to supine minimum assist x 1 (Progressing)       Start:  08/25/24    Expected End:  09/08/24            STG - Patient will transfer sit to and from stand minimum assist with wheeled walker (Progressing)       Start:  08/25/24    Expected End:  09/08/24                           Education Documentation  No documentation found.  Education Comments  No comments found.

## 2024-08-25 NOTE — CARE PLAN
The patient's goals for the shift include  patient will increase mobility to prevent pressure injuries.     The clinical goals for the shift include patient will remain safe and free of injury through night.

## 2024-08-25 NOTE — HOSPITAL COURSE
KATHI Dale is a 84 y.o. year old male  patient with history of 2 strokes, most recent in January 2024, seizure disorder, CVA, afib with RVR, vascular dementia, COPD, and HTN presenting for seizure. Patient is non-verbal, originally from [place]. His sister-in-law is present on evaluation. Unfortunately his sister-in-law was not aware of much of the patient's history, however she witnessed the seizure occurring this morning after the patient had finished his breakfast. She described his movements as upper and lower extremity jerking motions that lasted for over 5 minutes. She had contacted the paramedics and had laid the patient on the ground. The patient did not fall or injure himself. The sister-in-law reports that he may have had a fever when she went to place him as his forehead was very hot. According to EMS, the patient had a lactate of 10 prior to arrival in the ED. Otherwise the family member does not believe the patient was having any other symptoms including shortness of breath, chest pain, dysuria, nausea, vomiting, abdominal pain, or diarrhea.     ED Course  Patient was significantly altered on presentation to the ED. Per ED notes, patient was withdrawing from pain, had spontaneous eye movement, and was tracking without verbal response. Vitals at the time of evaluation were unremarkable. Urinalysis was positive for trace ketones and glucose. Labs were significant for profound leukocytosis of 57.4, lymphocyte predominant and creatinine of 1.38. The patient had a history of Chronic Lymphocytic Leukemia, which was consistent with his elevated leukocytes. The patient received 2000 mg of Levetiracetam in the ED and 1 liter lactated ringer bolus. Unfortunately keppra levels were taken at the same time as he received the keppra infusion in the ED, which led to a falsely elevated level. The patient's lactate improved to 3.1. On physical exam there appeared to be a surgical scar on the patient's lower  lumbar spine with mildly increased temperature on palpation and mild swelling. However, he underwent CXR and multiple CT imaging for his lumbar spine, brain, head, neck, and abdomen which were unremarkable for acute processes. The patient was then transferred to the general floor.    General Floor  On the general floor, the patient's home medication was continued, including his home dose of Keppra. The patient became more alert on the general floor. He was placed on telemetry, however unfortunately the patient kept repeatedly removing the telemetry leads. Furthermore, while the patient had passed his bedside swallow evaluation and was given a diet, he kept rejecting meals. The patient received one dose of Zyprexa over night for sedation. On the day of discharge, the patient underwent an EEG. He was more alert and oriented x 1. The patient was more talkative and was able to answer some questions, however he was unable to follow commands. Per the patient's wife, this appears to be the patient's baseline. His wbc count decreased to 45.4 and appears to be chronic due to the patient's history of Leukemia. His creatinine improved to 1.18 and the remainder of the patient's labs were unremarkable. The patient's blood smear was significant for smudge cells. Furthermore, the patients inflammatory markers were negative. INR was decreased from expected during hospital stay, patient was initiated on Enoxaparin for bridge while home warfarin dose was resumed. PT/OT recommended moderate intensity level of care upon discharge. Patient was discharged to SNF on 8/26/24. Instructed to continue Lovenox BID in addition to Warfarin 6mg daily until INR therapeutic. Follow up to be scheduled with PCP and Neurology.

## 2024-08-25 NOTE — PROGRESS NOTES
Occupational Therapy    Evaluation    Patient Name: Arturo Dale  MRN: [id number]  Today's Date: 8/25/2024  Time Calculation  Start Time: 0933  Stop Time: 1016  Time Calculation (min): 43 min    Assessment  IP OT Assessment  OT Assessment: Pt with reported deficits prior to seizure as result of CVA with most recent 1/2024. Spouse states up until this admission, pt required up to min A to complete ADLs and could transfer self from surface to surface and use of wheelchair in the home. Pt below this baseline at time of this assessment and would benefit from skilled intervention.  Prognosis: Good  Evaluation/Treatment Tolerance: Patient tolerated treatment well  End of Session Communication: Bedside nurse, Care Coordinator  End of Session Patient Position: Bed, 3 rail up, Alarm on (spouse in room)  Plan:  Treatment Interventions: ADL retraining, Functional transfer training, UE strengthening/ROM, Endurance training, Patient/family training, Equipment evaluation/education, Neuromuscular reeducation, Compensatory technique education  OT Frequency: 3 times per week  OT Discharge Recommendations: Moderate intensity level of continued care  Equipment Recommended upon Discharge: Wheeled walker  OT Recommended Transfer Status: Minimal assist, Assist of 1, Assist of 2  OT - OK to Discharge: Yes    Subjective   Current Problem:  1. Altered mental status, unspecified altered mental status type  EEG    Referral to Neurology    Referral to [facility] at Home Program      2. Seizure (Multi)  levETIRAcetam (Keppra) 500 mg tablet    Referral to Home Care    Referral to Neurology      3. Leukocytosis, unspecified type        4. Seizure disorder (Multi)  Referral to Neurology    DISCONTINUED: lacosamide (Vimpat) 50 mg tablet      5. Vitamin D deficiency          General:  General  Reason for Referral: OT for ADL assessment  Referred By: Mateo Auguts MD  Past Medical History Relevant to Rehab: CVA x 2; most recent CVA 1/2024 with  residual R sided neglect; Afib with RVR, vascular dementia, COPD, HTN  Missed Visit: No  Family/Caregiver Present: Yes  Caregiver Feedback: Spouse states pt baseline following CVA 1/2024 required min A with bathing and dressing, pt able to transfer self or assist of 1 to wheelchair, toilet, bed. Pt fed himself and able to communicate basic needs despite residual communication impairments following CVA 1/2024  Co-Treatment: PT  Co-Treatment Reason: to maximize pt safety and outcomes while focusing on discipline specific goals  Prior to Session Communication: Bedside nurse, Care Coordinator  Patient Position Received: Bed, 3 rail up, Alarm on  Preferred Learning Style: verbal, kinesthetic, visual  General Comment: New OT orders received and chart reviewed. Pt and spouse educated on reason for OT consultation and acute services. Pt and spouse agreeable to participate.  Precautions:  Hearing/Visual Limitations: Right side visual neglect noted from previous strokes  UE Weight Bearing Status: Weight Bearing as Tolerated  LE Weight Bearing Status: Weight Bearing as Tolerated  Medical Precautions: Fall precautions     Pain:  Pain Assessment  Pain Assessment: 0-10  0-10 (Numeric) Pain Score: 0 - No pain    Objective   Cognition:  Overall Cognitive Status: Impaired  Arousal/Alertness: Inconsistent responses to stimuli  Orientation Level:  (responds to name only; inconsistent with responses)  Following Commands: Follows one step commands with repetition  Safety Judgment: Decreased awareness of need for assistance  Problem Solving:  (impaired)  Attention:  (decreased attention)  Memory:  (impaired)  Problem Solving:  (impaired)  Numeric Reasoning:  (impaired)  Abstract Reasoning:  (impaired)  Safety/Judgement:  (impaired)  Insight: Severe  Impulsive: Mildly  Processing Speed: Delayed     Confusion Assessment Method (CAM)  Acute Onset and Fluctuating Course (1A): Yes  Acute Onset and Fluctuating Course (1B): Yes  Inattention  (2): Yes  Disorganized Thinking (3): Yes  Rate Patient's Level of Consciousness (4): Alert (Normal), No  Delirium Present: Yes  Bowen Agitation Sedation Scale  Bowen Agitation Sedation Scale (RASS): Alert and calm  Home Living:  Type of Home: House  Lives With: Spouse  Home Adaptive Equipment: Walker rolling or standard, Wheelchair-manual, Cane  Home Layout: One level  Home Access: Ramped entrance  Bathroom Shower/Tub: Walk-in shower  Bathroom Toilet: Standard  Bathroom Equipment: Shower chair with back  Home Living Comments: Pt/spouse staying with famly/friends they are visiting this summer. Pt lives in [place].  Both living conditions accesible to pt with no barriers   Prior Function:  Level of [place]: Needs assistance with ADLs, Needs assistance with homemaking, Needs assistance with functional transfers (assistance of 1 at min A level or below per pt spouse within 1 week of this admission)  Receives Help From: Family  Prior Function Comments: spouse reports pt required some assistance with completing ADLs however, pt following directions, initiated tasks and could communicate basic needs  IADL History:  Homemaking Responsibilities: No  Current License: No  Mode of Transportation: Car  ADL:  Eating Assistance: Moderate  Eating Deficit: Increased time to complete (decreased initiation)  Grooming Assistance: Moderate  Grooming Deficit: Setup, Verbal cueing, Increased time to complete, Shaving, Wash/dry face, Teeth care  Bathing Assistance: Maximal  Bathing Deficit: Setup, Verbal cueing, Increased time to complete  (decreased initiation; decreased planning; decreased follow through. Pt unable to transfer or stand independently to complete task)  UE Dressing Assistance: Moderate  LE Dressing Assistance: Maximal  Toileting Assistance with Device: Total  Functional Assistance: Maximal  ADL Comments: Pt presents with decreased task initiation, decreased balance, decreased safety awareness negatively  impacting his participation and performance in ADL at this time  Activity Tolerance:  Endurance: Tolerates less than 10 min exercise with changes in vital signs  Activity Tolerance Comments: pt fatigues quickly  Bed Mobility/Transfers: Bed Mobility  Bed Mobility: Yes  Bed Mobility 1  Bed Mobility 1: Supine to sitting  Level of Assistance 1: Moderate assistance  Bed Mobility Comments 1: with moderated verbal cues and tactile cues, HOB elevated, and increased time    Transfers  Transfer: Yes  Transfer 1  Technique 1: Sit to stand, Stand to sit  Transfer Device 1: Walker  Transfer Level of Assistance 1: Minimum assistance, Moderate assistance (of 1)  Trials/Comments 1: pt completed sit<>stand 3 times from EOB.  Pt unable to stand without supporting back of legs against bed. Decreased command following, decreased awareness.    Functional Mobility:  Functional Mobility  Functional Mobility Performed: No (pt unable to motor plan or sequence mobility)  Sitting Balance:  Static Sitting Balance  Static Sitting-Balance Support: Feet supported, Bilateral upper extremity supported  Static Sitting-Level of Assistance: Contact guard  Dynamic Sitting Balance  Dynamic Sitting-Balance Support: Feet supported, Bilateral upper extremity supported  Dynamic Sitting-Level of Assistance: Contact guard  Standing Balance:  Static Standing Balance  Static Standing-Balance Support: Bilateral upper extremity supported  Static Standing-Level of Assistance: Minimum assistance, Moderate assistance (x1)  Static Standing-Comment/Number of Minutes: with FWW  Dynamic Standing Balance  Dynamic Standing-Balance Support: Bilateral upper extremity supported  Dynamic Standing-Level of Assistance: Moderate assistance  Dynamic Standing-Comments: unable to complete L<>R wt shift  Modalities:  Modalities Used: No  IADL's:   Homemaking Responsibilities: No  Current License: No  Mode of Transportation: Car  Vision: Vision - Basic Assessment  Current Vision:  Does not wear glasses  Patient Visual Report:  (spouse reports R neglect; unable to assess vision due to decreased command following and decreased awareness)  Sensation:  Light Touch: Not tested  Sensation Comment: unable to assess  Strength:  Strength Comments: BUE appear WFL  Perception:  Inattention/Neglect: Cues to attend right visual field, Cues to attend to right side of body  Initiation: Cues to initiate tasks  Motor Planning: Hand over hand to sequence tasks  Perseveration:  (present)  Coordination:  Movements are Fluid and Coordinated: Yes   Hand Function:  Hand Function  Gross Grasp: Functional  Coordination: Functional  Extremities: RUE   RUE : Within Functional Limits, LUE   LUE: Within Functional Limits, RLE   RLE : Within Functional Limits, and LLE   LLE : Within Functional Limits    Outcome Measures: [facility] Daily Activity  Putting on and taking off regular lower body clothing: Total  Bathing (including washing, rinsing, drying): A lot  Putting on and taking off regular upper body clothing: A lot  Toileting, which includes using toilet, bedpan or urinal: Total  Taking care of personal grooming such as brushing teeth: A lot  Eating Meals: A lot  Daily Activity - Total Score: 10    Goals:   Encounter Problems       Encounter Problems (Active)       ADLs       Patient will complete daily grooming tasks brushing teeth and washing face/hair with stand by assist level of assistance and PRN adaptive equipment while standing.       Start:  08/25/24    Expected End:  09/08/24               COGNITION/SAFETY       Patient will follow at least 75% Simple and One step commands to allow improved ADL performance.       Start:  08/25/24    Expected End:  09/08/24            Patient will demonstrated orientation x 2 with verbal cues and visual cues.       Start:  08/25/24    Expected End:  09/08/24       ORIENTATION            TRANSFERS       Patient will complete functional transfer to with least restrictive device  with supervision level of assistance.       Start:  08/25/24    Expected End:  09/08/24               VISION       Patient will visually attend to Left/Right  side of Tray, Room, Paper, and Body using compensatory strategies and  minimal assist  level of assistance.        Start:  08/25/24    Expected End:  09/08/24            Patient will be educated and demonstrate HEP for ocular motor exercises with min cues.       Start:  08/25/24    Expected End:  09/08/24

## 2024-08-26 ENCOUNTER — DOCUMENTATION (OUTPATIENT)
Dept: HOME HEALTH SERVICES | Facility: HOME HEALTH | Age: 84
End: 2024-08-26
Payer: MEDICARE

## 2024-08-26 VITALS
TEMPERATURE: 97.3 F | DIASTOLIC BLOOD PRESSURE: 78 MMHG | SYSTOLIC BLOOD PRESSURE: 122 MMHG | HEART RATE: 57 BPM | RESPIRATION RATE: 18 BRPM | BODY MASS INDEX: 34.48 KG/M2 | OXYGEN SATURATION: 93 % | WEIGHT: 227.51 LBS | HEIGHT: 68 IN

## 2024-08-26 PROBLEM — F01.50: Status: ACTIVE | Noted: 2024-08-26

## 2024-08-26 LAB
ALBUMIN SERPL BCP-MCNC: 3.5 G/DL (ref 3.4–5)
ANION GAP SERPL CALC-SCNC: 11 MMOL/L (ref 10–20)
ATRIAL RATE: 82 BPM
B BURGDOR DNA SPEC QL NAA+PROBE: NOT DETECTED
BASOPHILS # BLD AUTO: 0.05 X10*3/UL (ref 0–0.1)
BASOPHILS NFR BLD AUTO: 0.1 %
BUN SERPL-MCNC: 19 MG/DL (ref 6–23)
CALCIUM SERPL-MCNC: 9 MG/DL (ref 8.6–10.3)
CHLORIDE SERPL-SCNC: 103 MMOL/L (ref 98–107)
CO2 SERPL-SCNC: 28 MMOL/L (ref 21–32)
CREAT SERPL-MCNC: 1.38 MG/DL (ref 0.5–1.3)
EGFRCR SERPLBLD CKD-EPI 2021: 50 ML/MIN/1.73M*2
EOSINOPHIL # BLD AUTO: 0.2 X10*3/UL (ref 0–0.4)
EOSINOPHIL NFR BLD AUTO: 0.4 %
ERYTHROCYTE [DISTWIDTH] IN BLOOD BY AUTOMATED COUNT: 14.1 % (ref 11.5–14.5)
GLUCOSE SERPL-MCNC: 96 MG/DL (ref 74–99)
HCT VFR BLD AUTO: 42.5 % (ref 41–52)
HGB BLD-MCNC: 13.7 G/DL (ref 13.5–17.5)
IMM GRANULOCYTES # BLD AUTO: 0.16 X10*3/UL (ref 0–0.5)
IMM GRANULOCYTES NFR BLD AUTO: 0.3 % (ref 0–0.9)
INR PPP: 1.4 (ref 0.9–1.1)
LYMPHOCYTES # BLD AUTO: 39.1 X10*3/UL (ref 0.8–3)
LYMPHOCYTES NFR BLD AUTO: 84.4 %
MAGNESIUM SERPL-MCNC: 1.91 MG/DL (ref 1.6–2.4)
MCH RBC QN AUTO: 30.2 PG (ref 26–34)
MCHC RBC AUTO-ENTMCNC: 32.2 G/DL (ref 32–36)
MCV RBC AUTO: 94 FL (ref 80–100)
MONOCYTES # BLD AUTO: 0.86 X10*3/UL (ref 0.05–0.8)
MONOCYTES NFR BLD AUTO: 1.9 %
NEUTROPHILS # BLD AUTO: 5.95 X10*3/UL (ref 1.6–5.5)
NEUTROPHILS NFR BLD AUTO: 12.9 %
NRBC BLD-RTO: 0 /100 WBCS (ref 0–0)
P AXIS: 48 DEGREES
P OFFSET: 164 MS
P ONSET: 105 MS
PHOSPHATE SERPL-MCNC: 3 MG/DL (ref 2.5–4.9)
PLATELET # BLD AUTO: 151 X10*3/UL (ref 150–450)
POTASSIUM SERPL-SCNC: 3.9 MMOL/L (ref 3.5–5.3)
PR INTERVAL: 208 MS
PROTHROMBIN TIME: 15.5 SECONDS (ref 9.8–12.8)
Q ONSET: 209 MS
QRS COUNT: 14 BEATS
QRS DURATION: 148 MS
QT INTERVAL: 424 MS
QTC CALCULATION(BAZETT): 495 MS
QTC FREDERICIA: 470 MS
R AXIS: -65 DEGREES
RBC # BLD AUTO: 4.54 X10*6/UL (ref 4.5–5.9)
RBC MORPH BLD: NORMAL
SODIUM SERPL-SCNC: 138 MMOL/L (ref 136–145)
SPECIMEN SOURCE: NORMAL
T AXIS: 21 DEGREES
T OFFSET: 421 MS
VENTRICULAR RATE: 82 BPM
WBC # BLD AUTO: 46.3 X10*3/UL (ref 4.4–11.3)

## 2024-08-26 PROCEDURE — 80069 RENAL FUNCTION PANEL: CPT

## 2024-08-26 PROCEDURE — 85025 COMPLETE CBC W/AUTO DIFF WBC: CPT

## 2024-08-26 PROCEDURE — 36415 COLL VENOUS BLD VENIPUNCTURE: CPT

## 2024-08-26 PROCEDURE — 83735 ASSAY OF MAGNESIUM: CPT

## 2024-08-26 PROCEDURE — 2500000001 HC RX 250 WO HCPCS SELF ADMINISTERED DRUGS (ALT 637 FOR MEDICARE OP)

## 2024-08-26 PROCEDURE — 2500000004 HC RX 250 GENERAL PHARMACY W/ HCPCS (ALT 636 FOR OP/ED)

## 2024-08-26 PROCEDURE — 2500000001 HC RX 250 WO HCPCS SELF ADMINISTERED DRUGS (ALT 637 FOR MEDICARE OP): Performed by: STUDENT IN AN ORGANIZED HEALTH CARE EDUCATION/TRAINING PROGRAM

## 2024-08-26 PROCEDURE — 99239 HOSP IP/OBS DSCHRG MGMT >30: CPT

## 2024-08-26 PROCEDURE — 97535 SELF CARE MNGMENT TRAINING: CPT | Mod: GO,CO

## 2024-08-26 PROCEDURE — 85610 PROTHROMBIN TIME: CPT

## 2024-08-26 PROCEDURE — 97530 THERAPEUTIC ACTIVITIES: CPT | Mod: GO,CO

## 2024-08-26 RX ORDER — WARFARIN 3 MG/1
6 TABLET ORAL DAILY
Status: DISCONTINUED | OUTPATIENT
Start: 2024-08-26 | End: 2024-08-26 | Stop reason: HOSPADM

## 2024-08-26 RX ORDER — ENOXAPARIN SODIUM 100 MG/ML
1 INJECTION SUBCUTANEOUS EVERY 12 HOURS
Start: 2024-08-26

## 2024-08-26 ASSESSMENT — COGNITIVE AND FUNCTIONAL STATUS - GENERAL
STANDING UP FROM CHAIR USING ARMS: A LITTLE
EATING MEALS: TOTAL
MOVING TO AND FROM BED TO CHAIR: A LITTLE
DRESSING REGULAR LOWER BODY CLOTHING: TOTAL
HELP NEEDED FOR BATHING: TOTAL
WALKING IN HOSPITAL ROOM: TOTAL
DAILY ACTIVITIY SCORE: 6
PERSONAL GROOMING: TOTAL
DRESSING REGULAR UPPER BODY CLOTHING: TOTAL
TOILETING: TOTAL
CLIMB 3 TO 5 STEPS WITH RAILING: TOTAL
TURNING FROM BACK TO SIDE WHILE IN FLAT BAD: A LITTLE
MOVING FROM LYING ON BACK TO SITTING ON SIDE OF FLAT BED WITH BEDRAILS: A LITTLE
MOBILITY SCORE: 14

## 2024-08-26 ASSESSMENT — PAIN SCALES - PAIN ASSESSMENT IN ADVANCED DEMENTIA (PAINAD)
BREATHING: NORMAL
BODYLANGUAGE: RELAXED
FACIALEXPRESSION: SMILING OR INEXPRESSIVE
TOTALSCORE: 0
CONSOLABILITY: NO NEED TO CONSOLE

## 2024-08-26 ASSESSMENT — ACTIVITIES OF DAILY LIVING (ADL): HOME_MANAGEMENT_TIME_ENTRY: 19

## 2024-08-26 NOTE — PROGRESS NOTES
Internal Medicine - Daily Progress Note   Hospital Day: 5       Name:Arturo Dale, AGE: 84 y.o., GENDER: male, MRN: [id number], ROOM: 119/[facility]   CODE STATUS: Full Code  Attending Physician: Mateo August MD  Resident: Selena Jimenes DO        Chief Complaint     Chief Complaint   Patient presents with    Altered Mental Status        Subjective    Arturo Dale is a 84 y.o. year old male patient on Hospital Day: 5. Patient awake and alert, responsive. Pt has no complaints, denies any pain or SOB. No signs of infection.     Overnight events:  NAOE    Meds    Scheduled medications  amantadine, 100 mg, oral, q12h MILEY  citalopram, 20 mg, oral, Daily  enoxaparin, 1 mg/kg, subcutaneous, q12h  levETIRAcetam, 1,000 mg, oral, q12h MILEY  melatonin, 5 mg, oral, Nightly  metoprolol tartrate, 25 mg, oral, Daily  polyethylene glycol, 17 g, oral, Daily  sennosides-docusate sodium, 2 tablet, oral, BID  warfarin, 6 mg, oral, Daily      Continuous medications     PRN medications  PRN medications: acetaminophen **OR** acetaminophen **OR** acetaminophen, magnesium hydroxide, ondansetron **OR** ondansetron     Objective    Physical Exam  Constitutional:       General: He is not in acute distress.     Appearance: He is not ill-appearing.   Eyes:      Extraocular Movements: Extraocular movements intact.      Conjunctiva/sclera: Conjunctivae normal.   Cardiovascular:      Rate and Rhythm: Normal rate and regular rhythm.      Pulses: Normal pulses.      Heart sounds: Normal heart sounds. No murmur heard.     No gallop.   Pulmonary:      Effort: Pulmonary effort is normal. No respiratory distress.      Breath sounds: Normal breath sounds. No wheezing or rales.   Abdominal:      General: Abdomen is flat. There is no distension.      Palpations: Abdomen is soft.      Tenderness: There is no abdominal tenderness. There is no guarding or rebound.   Neurological:      Mental Status: He is alert. Mental status is at baseline.       "Sensory: No sensory deficit.      Motor: No weakness.      Comments: Unable to perform full neuro exam        Visit Vitals  /79 (BP Location: Right arm, Patient Position: Lying)   Pulse 67   Temp 36.5 °C (97.7 °F) (Temporal)   Resp 18   Ht 1.727 m (5' 8\")   Wt 103 kg (227 lb 8.2 oz)   SpO2 95%   BMI 34.59 kg/m²   Smoking Status Never   BSA 2.22 m²        Intake/Output Summary (Last 24 hours) at 8/26/2024 1114  Last data filed at 8/26/2024 0600  Gross per 24 hour   Intake 480 ml   Output 1250 ml   Net -770 ml       Labs:   Results from last 72 hours   Lab Units 08/26/24  0538 08/25/24  0545 08/24/24  0742   SODIUM mmol/L 138 139 136   POTASSIUM mmol/L 3.9 3.9 3.6   CHLORIDE mmol/L 103 101 103   CO2 mmol/L 28 28 28   BUN mg/dL 19 19 16   CREATININE mg/dL 1.38* 1.30 1.27   GLUCOSE mg/dL 96 102* 95   CALCIUM mg/dL 9.0 9.1 9.2   ANION GAP mmol/L 11 14 9*   EGFR mL/min/1.73m*2 50* 54* 55*   PHOSPHORUS mg/dL 3.0 2.8 2.8      Results from last 72 hours   Lab Units 08/26/24  0538 08/25/24  0545 08/24/24  0905 08/24/24  0742   WBC AUTO x10*3/uL 46.3* 47.3* 45.9* 48.4*   HEMOGLOBIN g/dL 13.7 13.9 14.3 13.7   HEMATOCRIT % 42.5 43.0 44.1 43.7   PLATELETS AUTO x10*3/uL 151 160 161 164   NEUTROS PCT AUTO % 12.9  --   --  12.1   LYMPHO PCT MAN %  --  81.0  --   --    LYMPHS PCT AUTO % 84.4  --   --  85.4   MONO PCT MAN %  --  4.0  --   --    MONOS PCT AUTO % 1.9  --   --  1.7   EOSINO PCT MAN %  --  0.0  --   --    EOS PCT AUTO % 0.4  --   --  0.4      Lab Results   Component Value Date    CALCIUM 9.0 08/26/2024    PHOS 3.0 08/26/2024      Lab Results   Component Value Date    CRP 0.82 08/22/2024       [unfilled]     Micro/ID:   Susceptibility data from last 90 days.  Collected Specimen Info Organism   08/22/24 Blood culture from Peripheral Venipuncture Staphylococcus epidermidis     Results from last 7 days   Lab Units 08/22/24  1844   CMV IGG  Nonreactive                No lab exists for component: \"AGALPCRNB\"     Lab " Results   Component Value Date    BLOODCULT No growth at 3 days 08/22/2024       Images    EEG    Result Date: 8/23/2024  IMPRESSION Impression This routine EEG is consistent with left hemispheric structural lesion and a moderate diffuse encephalopathy. No epileptiform discharges are recorded. A full report will be scanned into the patient's chart at a later time. This report has been interpreted and electronically signed by    ECG 12 lead    Result Date: 8/23/2024  Normal sinus rhythm Right bundle branch block Left anterior fascicular block  Bifascicular block  Abnormal ECG No previous ECGs available    CT abdomen pelvis wo IV contrast    Result Date: 8/22/2024  Interpreted By:  Jennifer Mcdonald, STUDY: CT ABDOMEN PELVIS WO IV CONTRAST;  8/22/2024 8:12 pm   INDICATION: Signs/Symptoms:c/f abd abscess.   COMPARISON: None   ACCESSION NUMBER(S): [id number]   ORDERING CLINICIAN: ALEXA HERNANDEZ   TECHNIQUE: CT of the abdomen and pelvis was performed. Contiguous axial images were obtained at 3 mm slice thickness through the abdomen and pelvis. Coronal and sagittal reconstructions at 3 mm slice thickness were performed.  No intravenous or oral contrast agents were administered.   FINDINGS: Please note that the evaluation of vessels, lymph nodes and organs is limited without intravenous contrast. Additionally, the study is somewhat degraded by motion.   LOWER CHEST: Atelectatic changes are present in the lower lobes bilaterally without evidence of consolidation or pleural effusion. Subpleural calcifications are present in the inferior aspect of the right middle and left upper lobe and in the lower lobes bilaterally.   Heart is normal in size without pericardial effusion.   Distal esophagus is unremarkable in appearance.   ABDOMEN:   LIVER: Within limits of motion and noncontrast exam no acute hepatic abnormality is present.   BILE DUCTS: No intrahepatic or extrahepatic biliary dilatation is present.   GALLBLADDER:  Layering hyperdense material is present in the gallbladder, likely representing sludge or stones, without significant thickening of the gallbladder wall or surrounding pericholecystic stranding.   PANCREAS: No pancreatic ductal dilatation or peripancreatic stranding is present.   SPLEEN: Spleen is unremarkable in appearance.   ADRENAL GLANDS: Bilateral adrenal glands are unremarkable in appearance.   KIDNEYS AND URETERS: Left kidney is asymmetrically smaller in size compared to the contralateral right, likely due to chronic ischemia. No hydronephrosis is identified bilaterally. 4.5 cm exophytic cyst arises from the right kidney.   No hydronephrosis is present bilaterally. Visualized upper ureters are unremarkable in appearance.   PELVIS:   BLADDER: Bladder is opacify with positive oral contrast. No bladder wall thickening is present, although small bladder diverticulum is noted.   REPRODUCTIVE ORGANS: Prostate is slightly enlarged.   BOWEL: Stomach is unremarkable in appearance. No abnormal small bowel dilatation is present. Appendix is not well visualized, although no inflammatory changes are present in the its expected location in the right lower quadrant. Scattered diverticula are present in the descending and sigmoid colon without evidence of acute diverticulitis.   Large volume of solid stool is present in the rectum.   VESSELS: Atherosclerotic plaques are present in the abdominal aorta without evidence of acute vascular abnormality.   PERITONEUM/RETROPERITONEUM/LYMPH NODES: There is no evidence of free air, free fluid, or thick-walled collections in the abdomen or pelvis. No enlarged lymphadenopathy is identified.   ABDOMINAL WALL: Cutaneous tissues of the abdomen and pelvis do not demonstrate any acute abnormality.   BONES: No acute osseous abnormality is identified. Please refer to separately dictated same day CT of the lumbar spine for further characterization of spinal findings.       1.  Within limits of  motion degraded, noncontrast exam, no definite evidence of acute abnormality is identified in the abdomen or pelvis. 2. Layering hyperdense material is present in the gallbladder, likely representing sludge, without evidence of wall thickening or pericholecystic stranding. Right upper quadrant ultrasound can be considered for better characterization. 3. Left kidney is asymmetrically smaller in size compared to the contralateral right, likely due to chronic ischemia, without evidence of hydronephrosis bilaterally. Punctate non occluding stone is present in the lower pole of the right kidney. 4. Scattered diverticula are present in the descending and sigmoid colon without evidence of acute diverticulitis. Large volume of solid stool is present in the rectum.     MACRO: None   Signed by: Jennifer Mcdonald 8/22/2024 8:33 PM Dictation workstation:   [id number]    CT lumbar spine wo IV contrast    Result Date: 8/22/2024  Interpreted By:  Jennifer Mcdonald, STUDY: CT LUMBAR SPINE WO IV CONTRAST  8/22/2024 6:10 pm   INDICATION: Signs/Symptoms:c/f abscess   COMPARISON: None.   ACCESSION NUMBER(S): [id number]   ORDERING CLINICIAN: ALEXA HERNANDEZ   TECHNIQUE: Axial CT images of the lumbar spine are obtained. Axial, coronal and sagittal reconstructions are provided for review.   FINDINGS: There are 5 lumbar type non rib-bearing vertebral bodies, with the lowest well-formed intervertebral disc space labeled L5-S1.   Postsurgical changes consistent with discectomies and intervertebral disc grafts at L3-L4 and L4-L5 with posterior spinal fusion with pedicle screws and interlocking rods extending from the level of L3-L4. Associated beam hardening artifact slightly limits evaluation of the adjacent structures. Within limits of the exam there is no evidence of perihardware lucency or fracture.   There is a 8-9 mm anterolisthesis of L4 on L5, with bony ankylosis of L3-L4 and L4-L5. Lumbar vertebral alignment of the levels is  maintained.   Lumbar vertebral body heights are preserved without evidence of compression fractures.   There is no evidence of acute trauma to the posterior elements of the lumbar spine. Multilevel degenerate facet osteoarthropathy thrombosis is present, most pronounced at L4-L5 and L5-S1 on the right.   Subtle spinal canal stenosis is present, with at least moderate narrowing suspected at the levels of L2-L3 due to disc osteophyte complex and ligamentum flavum thickening and L3-L4 due to disc osteophyte complex and ossification of the posterior longitudinal ligament.   At least moderate stenosis is also present at the level of L4-L5 due to spondylolisthesis, hypertrophic facet changes and disc osteophyte complex.   Multilevel neural foraminal stenosis is present, with moderate to severe narrowing suspected at the level of L2-L3 due to hypertrophic facet changes, and at least moderate narrowing present at L3-L4 and L4-L5.   Prevertebral and paraspinal soft tissues do not demonstrate any acute abnormality.       1.  No evidence of acute process. Paraspinal soft tissues are unremarkable in appearance without evidence of fluid collections or soft tissue gas. 2. Degenerate present lumbar spine, with at least moderate stenosis suspected at the levels of L2-L3, L3-L4 and L4-L5 due to combination of disc osteophyte complexes, ligamentum flavum thickening, hypertrophic facet changes and spondylolisthesis at L4-L5. 3. Postsurgical changes of the lumbar spine consistent with discectomies and intervertebral disc space series L3-L4 and L4-L5 and posterior spinal fusion with pedicle screws and interlocking rods at L3-L4.   MACRO: None   Signed by: Jennifer Mcdonald 8/22/2024 6:29 PM Dictation workstation:   [id number]    XR chest 1 view    Result Date: 8/22/2024  Interpreted By:  Vitaliy Dumont, STUDY: XR CHEST 1 VIEW;  8/22/2024 5:15 pm   INDICATION: Signs/Symptoms:eval PNA.   COMPARISON: None.   ACCESSION NUMBER(S):  [id number]   ORDERING CLINICIAN: ANNEMARIE TREADWELL   FINDINGS:     CARDIOMEDIASTINAL SILHOUETTE: Cardiomediastinal silhouette is normal in size and configuration.   LUNGS: No consolidation, pneumothorax, or significant effusion. Mild bilateral reticular changes. Calcified pleural plaque along the right likely rib likely in prior specific exposure.   ABDOMEN: No remarkable upper abdominal findings.   BONES: No acute osseous changes.       1.  No evidence of acute cardiopulmonary process.     Signed by: Vitaliy Dumont 8/22/2024 5:19 PM Dictation workstation:   [id number]    CT brain attack angio head and neck W and WO IV contrast    Result Date: 8/22/2024  Interpreted By:  Eduar Rosario, STUDY: CT BRAIN ATTACK ANGIO HEAD AND NECK W AND WO IV CONTRAST;  8/22/2024 1:08 pm   INDICATION: Signs/Symptoms:facial droop with aphasia. concern for bleed   COMPARISON: 08/22/2024 CT head without contrast   ACCESSION NUMBER(S): [id number]   ORDERING CLINICIAN: DEREK REY   TECHNIQUE: Following IV contrast administration of iodinated contrast, a CT angiography of the head and neck was performed. MIPS and 3D reconstructions of the [place] and neck were created on an independent workstation and reviewed.   FINDINGS: There are calcified pleural plaques.   CTA NECK:       LEFT VERTEBRAL ARTERY:  No hemodynamically significant stenosis, occlusion, or dissection.   LEFT COMMON/INTERNAL CAROTID ARTERY: Minimal calcific atherosclerosis of the distal CCA and left carotid bulb. No hemodynamically significant stenosis, occlusion, or dissection.   RIGHT VERTEBRAL ARTERY:  No hemodynamically significant stenosis, occlusion, or dissection.   RIGHT COMMON/INTERNAL CAROTID ARTERY: Minimal calcific atherosclerosis of the carotid bulb. No hemodynamically significant stenosis, occlusion, or dissection.     The neck soft tissues show no evidence of mass, fluid collection, or enlarged lymph nodes. Multinodular goiter. There is no  acute osseous abnormality. Multilevel degenerative disc disease with disc spur complexes resulting in moderate C3-C4 canal stenosis, mild to moderate C5-C6 canal stenosis and mild C6-7 canal stenosis. Multilevel osteophytic neural foraminal stenosis notable for moderate-severe right C2-C3 foraminal stenosis, severe right C3-C4 foraminal stenosis, moderate severe left C4-C5 foraminal stenosis, severe right and moderate left C5-C6 foraminal stenosis and moderate C6-C7 foraminal silhouette       CTA HEAD:   ANTERIOR CIRCULATION: No aneurysm.   - Internal Carotid Arteries: Mild calcific atherosclerosis bilaterally. No hemodynamically significant stenosis or occlusion.   - Middle Cerebral Arteries: There is a paucity of left M4 vessels in the region the chronic infarct of the inferior MCA division; otherwise, no hemodynamically significant stenosis or occlusion.   - Anterior Cerebral Arteries:  No hemodynamically significant stenosis or occlusion.     POSTERIOR CIRCULATION: No aneurysm.   - Intracranial Vertebral Arteries: There is calcific atherosclerosis of the bilateral intracranial vertebral arteries without significant stenosis.   - Basilar Artery:  No hemodynamically significant stenosis or occlusion.   - Posterior Cerebral Arteries: There is fetal origin of the left posterior cerebral artery. No hemodynamically significant stenosis or occlusion.   No arteriovenous malformation is visualized. No pathologic intracranial enhancement or discrete mass. The dural venous sinuses are patent.   MIPS and 3D reconstructions confirm the above findings.       No hemodynamically significant intracranial or extracranial stenosis, large vessel occlusion, or aneurysm. Specifically, no evidence of right-sided occlusion to explain left-sided facial droop.   Scattered non hemodynamically significant atherosclerosis as described above.   Calcified bilateral pleural plaques likely related to prior asbestos exposure.   MACRO: None.    Signed by: Eduar Rosario 8/22/2024 1:34 PM Dictation workstation:   [id number]    CT brain attack head wo IV contrast    Result Date: 8/22/2024  Interpreted By:  Dane Perkins, STUDY: CT BRAIN ATTACK HEAD WO IV CONTRAST;  8/22/2024 1:04 pm   INDICATION: Signs/Symptoms:Stroke Evaluation.   COMPARISON: None.   ACCESSION NUMBER(S): [id number]   ORDERING CLINICIAN: DEREK REY   TECHNIQUE: Noncontrast axial CT scan of head was performed. Angled reformats in brain and bone windows were generated. The images were reviewed in bone, brain, blood and soft tissue windows.   FINDINGS: CSF Spaces: The ventricles, sulci and basal cisterns are prominent. There is no extraaxial fluid collection.   Parenchyma: Moderate cerebral volume loss. Right frontal and left temporoparietal CSF density areas likely related to prior infarcts. Bilateral periventricular white matter ill-defined hypodensities likely sequelae of chronic microvascular ischemia. No serious brain herniation or shift of midline structures. No major intracranial hemorrhage.   Calvarium: The calvarium is unremarkable.   Paranasal sinuses and mastoids: Visualized paranasal sinuses and mastoids are clear.       1. No acute intracranial hemorrhage or serious brain herniation 2. Right frontal and left temporoparietal CSF density areas/encephalomalacia likely sequelae of prior infarcts. 3. Moderate cerebral volume loss as well as sequelae of chronic microvascular ischemia.   If continued clinical concern for acute neurological deficit, advise further assessment by dedicated brain MRI.   MACRO: None   Signed by: Dane Perkins 8/22/2024 1:14 PM Dictation workstation:   [id number]      Assessment and Plan    Arturo Dale is a 84 y.o. male admitted on 8/22/2024 with history of 2 strokes, most recent in January 2024, seizure disorder, leukemia, vascular dementia, HLD, and HTN presenting for seizure.     ACUTE MEDICAL ISSUES:  # Seizure disorder  - Sister-in law  witnessed patient have a generalized tonic-clonic seizure on the day of admission, reportedly lasting greater than 5 minutes  - Last seizure was 5 years ago  - No current signs or sources of infection  - Vitals have remained stable during hospital stay, afebrile  - Patient is more alert, appears to be at baseline.  - No further seizure during hospital stay  Plan:  - Continue Keppra 1000 BID    #Decreased INR  #History of CVA  -Patient's wife reportedly checks his INR at home, typically it is around 3.0  -INR has been 1.1-1.2 during his hospital stay  -Unlikely to be due to elevated Keppra dose  -Possibly secondary to history of CLL  Plan:  -Increased warfarin dose with enoxaparin bridge  -Restarted on home dose of 6mg    #Weakness  -Wife reports patient to have an increased ability to ambulate at home and would like him reevaluated by PT/OT  - PT/OT - moderate level of intensity of care    #ALDO, likely prerenal  -Creatinine of 1.38 on admission  -Initially improved s/p 2L  -Repeat labs 8/26: Cr 1.38  -Ordered 1L LR  -Monitor I + O    Resolved Medical Issue:         CHRONIC MEDICAL ISSUES:  #HLD- Continue home Lipitor  #HTN- Continue home metoprolol    Fluids: Received 3L LR bolus total  Electrolytes: PRN  Nutrition: Regular (passed bedside swallow)  Antimicrobials: None currently  Lines: PIV  Supplemental Oxygen: RA  Emergency Contact: Extended Emergency Contact Information  Primary Emergency Contact: Patti Dale  Mobile Phone: [phone]  Relation: Spouse  Preferred language: English   needed? No   Code: Full Code     Disposition: Pending SNF placement      Selena Jimenes DO   08/26/24 at 11:14 AM

## 2024-08-26 NOTE — PROGRESS NOTES
Occupational Therapy    Occupational Therapy Treatment    Name: Arturo Dale  MRN: [id number]  : 1940  Date: 24  Time Calculation  Start Time: 957  Stop Time:   Time Calculation (min): 32 min    Assessment:  OT Assessment: Pt continues to display deficits in attention and perception creating significant difficulty within ADL and mobility components. Impaired ADLs and transfers continue to be noted to be variable and significantly  below his reported baseline. Continued skilled treatment and asssesment  is indicated for patient to benefit from skilled intervention.  Prognosis: Good  Evaluation/Treatment Tolerance: Patient limited by fatigue, Other (Comment) (Pt performance is significantly variable per nurse report. Overall participation in this session required greater assist over prior session.)  End of Session Communication: Bedside nurse, PCT/NA/CTA (Discussed pt limitations and difficulty with basic selfcare and mobility components this session.)  End of Session Patient Position: Bed, 3 rail up, Alarm on  Plan:  Treatment Interventions: ADL retraining, Functional transfer training, Patient/family training, Compensatory technique education  OT Frequency: 3 times per week  OT Discharge Recommendations: Moderate intensity level of continued care  Equipment Recommended upon Discharge: Wheeled walker  OT Recommended Transfer Status: Assist of 1, Assist of 2  OT - OK to Discharge: Yes (In accord with OT POC)    Subjective   Previous Visit Info:  OT Last Visit  OT Received On: 24  General:  General  Reason for Referral: OT for ADL assessment  Referred By: Mateo August MD  Past Medical History Relevant to Rehab: CVA x 2; 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 Responsive to <25% of direction and commands for basic selfcare and mobility tasks.  Precautions:  Hearing/Visual Limitations: Right visual neglect able to be compensated for with frequently repeated verbal and tactile cues  Medical Precautions: Fall precautions (IV, purewick male external catheter.)     Pain Assessment:  No verbal or non-verbal pain indicators noted in course of session.      Objective   Cognition:  Overall Cognitive Status: Impaired at baseline  Arousal/Alertness: Inconsistent responses to stimuli  Orientation Level: Disoriented X4  Following Commands: Follows one step commands with repetition (Inconsistently <25% of time with maximal cues, easily distracted and disengaged within basic selfcare tasks.)  Safety Judgment: Decreased awareness of need for assistance  Problem Solving:  (significantly impaired - disorganized with impaired awareness of need)  Insight: Severe  Impulsive: Moderately  Processing Speed: Delayed  Activities of Daily Living: Feeding  Feeding Level of Assistance: Dependent, Tactile cues  Feeding Where Assessed: Bed level  Feeding Comments: Hand over hand with single step verbal and tactile cues. Pt ate single bite of bannana and drank single sip from cup with straw following setup following less than 25% of direction and cues. Pt self distracting and disorganized wihin course of task.     Bed Mobility/Transfers: Bed Mobility  Bed Mobility: Yes  Bed Mobility 1  Bed Mobility 1: Supine to sitting, Sitting to supine, Rolling right, Rolling left  Level of Assistance 1: Moderate assistance  Bed Mobility Comments 1: Assist with trunk and bilateral LE's required. Pt at times asssisting and at times resisting within course of tasks demonstrated difficulty with maintaining task focus.  Bed rails used for side<>side rolling.  Bed Mobility 2  Bed Mobility  2: Sitting to supine  Level of Assistance 2: Maximum assistance  Bed Mobility Comments 2: Asssist with legs and trunk required for return to supine.    Transfers  Transfer: Yes  Transfer 1  Transfer From 1: Bed to  Transfer to 1: Stand  Technique 1: Sit to stand, Stand to sit  Transfer Device 1:  (Wheeled walker)  Transfer Level of Assistance 1: Moderate assistance, Maximum assistance  Trials/Comments 1: Sit<>stand significantly variable with strong retrolean and inability to establish standing balance to safely advance to transfers.    Sitting Balance:  Static Sitting Balance  Static Sitting-Balance Support: Feet supported, Bilateral upper extremity supported  Static Sitting-Level of Assistance: Minimum assistance, Moderate assistance  Static Sitting-Comment/Number of Minutes: highly variable with spontaneous retrolean with any dynamic components attempted.  Standing Balance:  Static Standing Balance  Static Standing-Balance Support: Bilateral upper extremity supported  Static Standing-Level of Assistance: Moderate assistance, Maximum assistance  Static Standing-Comment/Number of Minutes: With FWW ~1 minute maximum on 4 trials.    Outcome Measures:  [facility] Daily Activity  Putting on and taking off regular lower body clothing: Total  Bathing (including washing, rinsing, drying): Total  Putting on and taking off regular upper body clothing: Total  Toileting, which includes using toilet, bedpan or urinal: Total  Taking care of personal grooming such as brushing teeth: Total  Eating Meals: Total  Daily Activity - Total Score: 6    Education Documentation  Body Mechanics, taught by SOPHIA Espinoza at 8/26/2024 11:21 AM.  Learner: Patient  Readiness: Acceptance  Method: Explanation, Demonstration  Response: Needs Reinforcement, No Evidence of Learning, Demonstrated Understanding  Comment: Inconsistent attendence to direction and cues. No  followthrough noted in course of session.    Precautions, taught by SOPHIA Espinoza at 8/26/2024 11:21 AM.  Learner: Patient  Readiness: Acceptance  Method: Explanation, Demonstration  Response: Needs Reinforcement, No Evidence of Learning, Demonstrated Understanding  Comment: Inconsistent attendence to direction and cues. No followthrough noted in course of session.    ADL Training, taught by SOPHIA Espinoza at 8/26/2024 11:21 AM.  Learner: Patient  Readiness: Acceptance  Method: Explanation, Demonstration  Response: Needs Reinforcement, No Evidence of Learning, Demonstrated Understanding  Comment: Inconsistent attendence to direction and cues. No followthrough noted in course of session.    Education Comments  Pt not fully attentive to education/instruction in course of tasks.    Goals:  Encounter Problems       Encounter Problems (Active)       ADLs       Patient will complete daily grooming tasks brushing teeth and washing face/hair with stand by assist level of assistance and PRN adaptive equipment while standing. (Progressing)       Start:  08/25/24    Expected End:  09/08/24               COGNITION/SAFETY       Patient will follow at least 75% Simple and One step commands to allow improved ADL performance. (Progressing)       Start:  08/25/24    Expected End:  09/08/24            Patient will demonstrated orientation x 2 with verbal cues and visual cues. (Progressing)       Start:  08/25/24    Expected End:  09/08/24       ORIENTATION            TRANSFERS       Patient will complete functional transfer to with least restrictive device with supervision level of assistance. (Progressing)       Start:  08/25/24    Expected End:  09/08/24               VISION       Patient will visually attend to Left/Right  side of Tray, Room, Paper, and Body using compensatory strategies and  minimal assist  level of assistance.  (Progressing)       Start:  08/25/24    Expected End:  09/08/24            Patient will be  educated and demonstrate HEP for ocular motor exercises with min cues. (Progressing)       Start:  08/25/24    Expected End:  09/08/24

## 2024-08-26 NOTE — PROGRESS NOTES
08/26/24 1243   Discharge Planning   Living Arrangements Spouse/significant other;Family members  (Lives in [place] with spouse. Currently staying with sister in law at [address], [facility] [address])   Support Systems Spouse/significant other;Family members   Assistance Needed A&OX1 (knows name but location and year answered 72-sister in law bedside stated this is patient's baseline); dependent on assist for ADLs and uses wheelchair-can transfer self at times but mostly assist x 1-2 to chair; doesn't drive; room air baseline -currently 2L NC   Type of Residence Private residence   Number of Stairs to Enter Residence 0  (sister in laws home)   Number of Stairs Within Residence 0  (sister in laws home)   Do you have animals or pets at home? Yes   Type of Animals or Pets 2 dogs & 1 cat   Who is requesting discharge planning? Provider   Home or Post Acute Services None   Expected Discharge Disposition SNF  ([facility] (no precert))   Does the patient need discharge transport arranged? Yes   RoundTrip coordination needed? Yes   Has discharge transport been arranged? No            08/26/24 1605   Discharge Planning   Does the patient need discharge transport arranged? Yes   RoundTrip coordination needed? Yes   Has discharge transport been arranged? Yes   What day is the transport expected? 08/26/24   What time is the transport expected? 1630       8/26/2024 1616: Call to spouse to make her aware of discharge arrangements for 1630.

## 2024-08-26 NOTE — DISCHARGE SUMMARY
Discharge Diagnosis  Altered mental status, unspecified altered mental status type  Seizure disorder   Subtherapeutic INR   Hx CVA   Weakness   ALDO     Issues Requiring Follow-Up  Check INR daily until therapeutic (goal 2-3, pt's baseline is 3)   Continue Lovenox BID in addition to Warfarin until INR therapeutic   Follow up with PCP   Follow up with Neurology     Discharge Meds     Your medication list        START taking these medications        Instructions Last Dose Given Next Dose Due   enoxaparin 100 mg/mL syringe  Commonly known as: Lovenox      Inject 1 mL (100 mg) under the skin every 12 hours.              CHANGE how you take these medications        Instructions Last Dose Given Next Dose Due   levETIRAcetam 500 mg tablet  Commonly known as: Keppra  What changed: how much to take      Take 2 tablets (1,000 mg) by mouth every 12 hours.              CONTINUE taking these medications        Instructions Last Dose Given Next Dose Due   amantadine 100 mg capsule  Commonly known as: Symmetrel           cholecalciferol 5,000 Units tablet  Commonly known as: Vitamin D-3           cinnamon 500 mg capsule           citalopram 20 mg tablet  Commonly known as: CeleXA           cranberry 500 mg capsule           metoprolol tartrate 25 mg tablet  Commonly known as: Lopressor           Vitamin B-12 2,500 mcg tablet, sublingual SL tablet  Generic drug: cyanocobalamin (vitamin B-12)           warfarin 6 mg tablet  Commonly known as: Coumadin                  STOP taking these medications      UNABLE TO FIND                  Where to Get Your Medications        These medications were sent to [facility]  [address] [place] [address]      Hours: 9 AM to 5 PM Mon-Fri Phone: [phone]   levETIRAcetam 500 mg tablet       Information about where to get these medications is not yet available    Ask your nurse or doctor about these medications  enoxaparin 100 mg/mL syringe         Test Results Pending At  Discharge  Pending Labs       Order Current Status    Lyme disease, PCR In process    Blood Culture Preliminary result            Hospital Course  HPI  Arturo Dale is a 84 y.o. year old male  patient with history of 2 strokes, most recent in January 2024, seizure disorder, CVA, afib with RVR, vascular dementia, COPD, and HTN presenting for seizure. Patient is non-verbal, originally from [place]. His sister-in-law is present on evaluation. Unfortunately his sister-in-law was not aware of much of the patient's history, however she witnessed the seizure occurring this morning after the patient had finished his breakfast. She described his movements as upper and lower extremity jerking motions that lasted for over 5 minutes. She had contacted the paramedics and had laid the patient on the ground. The patient did not fall or injure himself. The sister-in-law reports that he may have had a fever when she went to place him as his forehead was very hot. According to EMS, the patient had a lactate of 10 prior to arrival in the ED. Otherwise the family member does not believe the patient was having any other symptoms including shortness of breath, chest pain, dysuria, nausea, vomiting, abdominal pain, or diarrhea.     ED Course  Patient was significantly altered on presentation to the ED. Per ED notes, patient was withdrawing from pain, had spontaneous eye movement, and was tracking without verbal response. Vitals at the time of evaluation were unremarkable. Urinalysis was positive for trace ketones and glucose. Labs were significant for profound leukocytosis of 57.4, lymphocyte predominant and creatinine of 1.38. The patient had a history of Chronic Lymphocytic Leukemia, which was consistent with his elevated leukocytes. The patient received 2000 mg of Levetiracetam in the ED and 1 liter lactated ringer bolus. Unfortunately keppra levels were taken at the same time as he received the keppra infusion in the ED, which  led to a falsely elevated level. The patient's lactate improved to 3.1. On physical exam there appeared to be a surgical scar on the patient's lower lumbar spine with mildly increased temperature on palpation and mild swelling. However, he underwent CXR and multiple CT imaging for his lumbar spine, brain, head, neck, and abdomen which were unremarkable for acute processes. The patient was then transferred to the general floor.    General Floor  On the general floor, the patient's home medication was continued, including his home dose of Keppra. The patient became more alert on the general floor. He was placed on telemetry, however unfortunately the patient kept repeatedly removing the telemetry leads. Furthermore, while the patient had passed his bedside swallow evaluation and was given a diet, he kept rejecting meals. The patient received one dose of Zyprexa over night for sedation. On the day of discharge, the patient underwent an EEG. He was more alert and oriented x 1. The patient was more talkative and was able to answer some questions, however he was unable to follow commands. Per the patient's wife, this appears to be the patient's baseline. His wbc count decreased to 45.4 and appears to be chronic due to the patient's history of Leukemia. His creatinine improved to 1.18 and the remainder of the patient's labs were unremarkable. The patient's blood smear was significant for smudge cells. Furthermore, the patients inflammatory markers were negative. INR was decreased from expected during hospital stay, patient was initiated on Enoxaparin for bridge while home warfarin dose was resumed. PT/OT recommended moderate intensity level of care upon discharge. Patient was discharged to SNF on 8/26/24. Instructed to continue Lovenox BID in addition to Warfarin 6mg daily until INR therapeutic. Follow up to be scheduled with PCP and Neurology.       Pertinent Physical Exam At Time of Discharge  Physical  Exam  Constitutional:       General: He is not in acute distress.     Appearance: He is not ill-appearing.   Eyes:      Extraocular Movements: Extraocular movements intact.      Conjunctiva/sclera: Conjunctivae normal.   Cardiovascular:      Rate and Rhythm: Normal rate and regular rhythm.      Pulses: Normal pulses.      Heart sounds: Normal heart sounds. No murmur heard.     No gallop.   Pulmonary:      Effort: Pulmonary effort is normal. No respiratory distress.      Breath sounds: Normal breath sounds. No wheezing or rales.   Abdominal:      General: Abdomen is flat. There is no distension.      Palpations: Abdomen is soft.      Tenderness: There is no abdominal tenderness. There is no guarding or rebound.   Neurological:      Mental Status: He is alert. Mental status is at baseline.      Sensory: No sensory deficit.      Motor: No weakness.      Comments: Unable to perform full neuro exam     Outpatient Follow-Up  No future appointments.      Julianne Cazares,

## 2024-08-27 ENCOUNTER — LAB REQUISITION (OUTPATIENT)
Dept: LAB | Facility: HOSPITAL | Age: 84
End: 2024-08-27
Payer: MEDICARE

## 2024-08-27 DIAGNOSIS — Z79.01 LONG TERM (CURRENT) USE OF ANTICOAGULANTS: ICD-10-CM

## 2024-08-27 LAB
BACTERIA BLD CULT: NORMAL
INR PPP: 1.5 (ref 0.9–1.1)
PROTHROMBIN TIME: 16.7 SECONDS (ref 9.8–12.8)

## 2024-08-27 PROCEDURE — 85610 PROTHROMBIN TIME: CPT | Mod: OUT | Performed by: INTERNAL MEDICINE

## 2024-08-28 ENCOUNTER — LAB REQUISITION (OUTPATIENT)
Dept: LAB | Facility: HOSPITAL | Age: 84
End: 2024-08-28
Payer: MEDICARE

## 2024-08-28 DIAGNOSIS — I10 ESSENTIAL (PRIMARY) HYPERTENSION: ICD-10-CM

## 2024-08-28 LAB
ALBUMIN SERPL BCP-MCNC: 3.6 G/DL (ref 3.4–5)
ALP SERPL-CCNC: 74 U/L (ref 33–136)
ALT SERPL W P-5'-P-CCNC: 15 U/L (ref 10–52)
ANION GAP SERPL CALC-SCNC: 12 MMOL/L (ref 10–20)
AST SERPL W P-5'-P-CCNC: 13 U/L (ref 9–39)
BILIRUB SERPL-MCNC: 0.5 MG/DL (ref 0–1.2)
BUN SERPL-MCNC: 20 MG/DL (ref 6–23)
CALCIUM SERPL-MCNC: 8.9 MG/DL (ref 8.6–10.3)
CHLORIDE SERPL-SCNC: 102 MMOL/L (ref 98–107)
CO2 SERPL-SCNC: 29 MMOL/L (ref 21–32)
CREAT SERPL-MCNC: 1.47 MG/DL (ref 0.5–1.3)
EGFRCR SERPLBLD CKD-EPI 2021: 46 ML/MIN/1.73M*2
ERYTHROCYTE [DISTWIDTH] IN BLOOD BY AUTOMATED COUNT: 13.7 % (ref 11.5–14.5)
GLUCOSE SERPL-MCNC: 86 MG/DL (ref 74–99)
HCT VFR BLD AUTO: 41.6 % (ref 41–52)
HGB BLD-MCNC: 13.4 G/DL (ref 13.5–17.5)
MCH RBC QN AUTO: 29.8 PG (ref 26–34)
MCHC RBC AUTO-ENTMCNC: 32.2 G/DL (ref 32–36)
MCV RBC AUTO: 92 FL (ref 80–100)
NRBC BLD-RTO: 0 /100 WBCS (ref 0–0)
PLATELET # BLD AUTO: 156 X10*3/UL (ref 150–450)
POTASSIUM SERPL-SCNC: 3.9 MMOL/L (ref 3.5–5.3)
PROT SERPL-MCNC: 5.6 G/DL (ref 6.4–8.2)
RBC # BLD AUTO: 4.5 X10*6/UL (ref 4.5–5.9)
SODIUM SERPL-SCNC: 139 MMOL/L (ref 136–145)
WBC # BLD AUTO: 40.4 X10*3/UL (ref 4.4–11.3)

## 2024-08-28 PROCEDURE — 80053 COMPREHEN METABOLIC PANEL: CPT | Mod: OUT | Performed by: INTERNAL MEDICINE

## 2024-08-28 PROCEDURE — 85027 COMPLETE CBC AUTOMATED: CPT | Mod: OUT | Performed by: INTERNAL MEDICINE

## 2024-08-30 ENCOUNTER — LAB REQUISITION (OUTPATIENT)
Dept: LAB | Facility: HOSPITAL | Age: 84
End: 2024-08-30
Payer: MEDICARE

## 2024-08-30 DIAGNOSIS — I10 ESSENTIAL (PRIMARY) HYPERTENSION: ICD-10-CM

## 2024-08-30 DIAGNOSIS — I50.9 HEART FAILURE, UNSPECIFIED (MULTI): ICD-10-CM

## 2024-08-30 LAB
INR PPP: 1.5 (ref 0.9–1.1)
PROTHROMBIN TIME: 17 SECONDS (ref 9.8–12.8)

## 2024-08-30 PROCEDURE — 85610 PROTHROMBIN TIME: CPT | Mod: OUT | Performed by: INTERNAL MEDICINE

## 2024-09-03 ENCOUNTER — LAB REQUISITION (OUTPATIENT)
Dept: LAB | Facility: HOSPITAL | Age: 84
End: 2024-09-03
Payer: MEDICARE

## 2024-09-03 DIAGNOSIS — I10 ESSENTIAL (PRIMARY) HYPERTENSION: ICD-10-CM

## 2024-09-03 DIAGNOSIS — Z79.01 LONG TERM (CURRENT) USE OF ANTICOAGULANTS: ICD-10-CM

## 2024-09-03 LAB
ALBUMIN SERPL BCP-MCNC: 4 G/DL (ref 3.4–5)
ALP SERPL-CCNC: 72 U/L (ref 33–136)
ALT SERPL W P-5'-P-CCNC: 20 U/L (ref 10–52)
ANION GAP SERPL CALC-SCNC: 15 MMOL/L (ref 10–20)
AST SERPL W P-5'-P-CCNC: 14 U/L (ref 9–39)
BILIRUB SERPL-MCNC: 0.5 MG/DL (ref 0–1.2)
BUN SERPL-MCNC: 25 MG/DL (ref 6–23)
CALCIUM SERPL-MCNC: 9.6 MG/DL (ref 8.6–10.3)
CHLORIDE SERPL-SCNC: 103 MMOL/L (ref 98–107)
CO2 SERPL-SCNC: 27 MMOL/L (ref 21–32)
CREAT SERPL-MCNC: 1.48 MG/DL (ref 0.5–1.3)
EGFRCR SERPLBLD CKD-EPI 2021: 46 ML/MIN/1.73M*2
ERYTHROCYTE [DISTWIDTH] IN BLOOD BY AUTOMATED COUNT: 13.7 % (ref 11.5–14.5)
GLUCOSE SERPL-MCNC: 147 MG/DL (ref 74–99)
HCT VFR BLD AUTO: 42.3 % (ref 41–52)
HGB BLD-MCNC: 13.4 G/DL (ref 13.5–17.5)
INR PPP: 1.2 (ref 0.9–1.1)
MCH RBC QN AUTO: 29.3 PG (ref 26–34)
MCHC RBC AUTO-ENTMCNC: 31.7 G/DL (ref 32–36)
MCV RBC AUTO: 93 FL (ref 80–100)
NRBC BLD-RTO: 0 /100 WBCS (ref 0–0)
PLATELET # BLD AUTO: 212 X10*3/UL (ref 150–450)
POTASSIUM SERPL-SCNC: 4 MMOL/L (ref 3.5–5.3)
PROT SERPL-MCNC: 6.3 G/DL (ref 6.4–8.2)
PROTHROMBIN TIME: 13.6 SECONDS (ref 9.8–12.8)
RBC # BLD AUTO: 4.57 X10*6/UL (ref 4.5–5.9)
SODIUM SERPL-SCNC: 141 MMOL/L (ref 136–145)
WBC # BLD AUTO: 38.4 X10*3/UL (ref 4.4–11.3)

## 2024-09-03 PROCEDURE — 85027 COMPLETE CBC AUTOMATED: CPT | Mod: OUT | Performed by: INTERNAL MEDICINE

## 2024-09-03 PROCEDURE — 85610 PROTHROMBIN TIME: CPT | Mod: OUT | Performed by: INTERNAL MEDICINE

## 2024-09-03 PROCEDURE — 36415 COLL VENOUS BLD VENIPUNCTURE: CPT | Mod: OUT | Performed by: INTERNAL MEDICINE

## 2024-09-03 PROCEDURE — 80053 COMPREHEN METABOLIC PANEL: CPT | Mod: OUT | Performed by: INTERNAL MEDICINE

## 2024-09-04 ENCOUNTER — LAB REQUISITION (OUTPATIENT)
Dept: LAB | Facility: HOSPITAL | Age: 84
End: 2024-09-04
Payer: MEDICARE

## 2024-09-04 DIAGNOSIS — I10 ESSENTIAL (PRIMARY) HYPERTENSION: ICD-10-CM

## 2024-09-04 LAB
ALBUMIN SERPL BCP-MCNC: 4 G/DL (ref 3.4–5)
ALP SERPL-CCNC: 74 U/L (ref 33–136)
ALT SERPL W P-5'-P-CCNC: 22 U/L (ref 10–52)
ANION GAP SERPL CALC-SCNC: 13 MMOL/L (ref 10–20)
AST SERPL W P-5'-P-CCNC: 19 U/L (ref 9–39)
BILIRUB SERPL-MCNC: 0.5 MG/DL (ref 0–1.2)
BUN SERPL-MCNC: 22 MG/DL (ref 6–23)
CALCIUM SERPL-MCNC: 9.5 MG/DL (ref 8.6–10.3)
CHLORIDE SERPL-SCNC: 100 MMOL/L (ref 98–107)
CO2 SERPL-SCNC: 26 MMOL/L (ref 21–32)
CREAT SERPL-MCNC: 1.32 MG/DL (ref 0.5–1.3)
EGFRCR SERPLBLD CKD-EPI 2021: 53 ML/MIN/1.73M*2
ERYTHROCYTE [DISTWIDTH] IN BLOOD BY AUTOMATED COUNT: 13.5 % (ref 11.5–14.5)
GLUCOSE SERPL-MCNC: 91 MG/DL (ref 74–99)
HCT VFR BLD AUTO: 43.6 % (ref 41–52)
HGB BLD-MCNC: 13.9 G/DL (ref 13.5–17.5)
MCH RBC QN AUTO: 29.4 PG (ref 26–34)
MCHC RBC AUTO-ENTMCNC: 31.9 G/DL (ref 32–36)
MCV RBC AUTO: 92 FL (ref 80–100)
NRBC BLD-RTO: 0 /100 WBCS (ref 0–0)
PLATELET # BLD AUTO: 215 X10*3/UL (ref 150–450)
POTASSIUM SERPL-SCNC: 4.2 MMOL/L (ref 3.5–5.3)
PROT SERPL-MCNC: 6.4 G/DL (ref 6.4–8.2)
RBC # BLD AUTO: 4.72 X10*6/UL (ref 4.5–5.9)
SODIUM SERPL-SCNC: 135 MMOL/L (ref 136–145)
WBC # BLD AUTO: 41 X10*3/UL (ref 4.4–11.3)

## 2024-09-04 PROCEDURE — 80053 COMPREHEN METABOLIC PANEL: CPT | Mod: OUT | Performed by: INTERNAL MEDICINE

## 2024-09-04 PROCEDURE — 36415 COLL VENOUS BLD VENIPUNCTURE: CPT | Performed by: INTERNAL MEDICINE

## 2024-09-04 PROCEDURE — 85027 COMPLETE CBC AUTOMATED: CPT | Mod: OUT | Performed by: INTERNAL MEDICINE

## 2024-09-04 PROCEDURE — 36415 COLL VENOUS BLD VENIPUNCTURE: CPT | Mod: OUT | Performed by: INTERNAL MEDICINE

## 2024-09-06 ENCOUNTER — LAB REQUISITION (OUTPATIENT)
Dept: LAB | Facility: HOSPITAL | Age: 84
End: 2024-09-06
Payer: MEDICARE

## 2024-09-06 DIAGNOSIS — Z79.01 LONG TERM (CURRENT) USE OF ANTICOAGULANTS: ICD-10-CM

## 2024-09-06 DIAGNOSIS — I10 ESSENTIAL (PRIMARY) HYPERTENSION: ICD-10-CM

## 2024-09-06 LAB
ANION GAP SERPL CALC-SCNC: 14 MMOL/L (ref 10–20)
BUN SERPL-MCNC: 18 MG/DL (ref 6–23)
CALCIUM SERPL-MCNC: 9.3 MG/DL (ref 8.6–10.3)
CHLORIDE SERPL-SCNC: 102 MMOL/L (ref 98–107)
CO2 SERPL-SCNC: 26 MMOL/L (ref 21–32)
CREAT SERPL-MCNC: 1.33 MG/DL (ref 0.5–1.3)
EGFRCR SERPLBLD CKD-EPI 2021: 52 ML/MIN/1.73M*2
GLUCOSE SERPL-MCNC: 148 MG/DL (ref 74–99)
INR PPP: 1.9 (ref 0.9–1.1)
POTASSIUM SERPL-SCNC: 4.2 MMOL/L (ref 3.5–5.3)
PROTHROMBIN TIME: 21.9 SECONDS (ref 9.8–12.8)
SODIUM SERPL-SCNC: 138 MMOL/L (ref 136–145)

## 2024-09-06 PROCEDURE — 85610 PROTHROMBIN TIME: CPT | Mod: OUT | Performed by: INTERNAL MEDICINE

## 2024-09-06 PROCEDURE — 36415 COLL VENOUS BLD VENIPUNCTURE: CPT | Mod: OUT | Performed by: INTERNAL MEDICINE

## 2024-09-06 PROCEDURE — 82374 ASSAY BLOOD CARBON DIOXIDE: CPT | Mod: OUT | Performed by: INTERNAL MEDICINE

## 2024-09-11 ENCOUNTER — LAB REQUISITION (OUTPATIENT)
Dept: LAB | Facility: HOSPITAL | Age: 84
End: 2024-09-11
Payer: MEDICARE

## 2024-09-11 DIAGNOSIS — Z79.01 LONG TERM (CURRENT) USE OF ANTICOAGULANTS: ICD-10-CM

## 2024-09-11 LAB
ANION GAP SERPL CALC-SCNC: 13 MMOL/L (ref 10–20)
BUN SERPL-MCNC: 25 MG/DL (ref 6–23)
CALCIUM SERPL-MCNC: 9.1 MG/DL (ref 8.6–10.3)
CHLORIDE SERPL-SCNC: 100 MMOL/L (ref 98–107)
CO2 SERPL-SCNC: 28 MMOL/L (ref 21–32)
CREAT SERPL-MCNC: 1.29 MG/DL (ref 0.5–1.3)
EGFRCR SERPLBLD CKD-EPI 2021: 54 ML/MIN/1.73M*2
GLUCOSE SERPL-MCNC: 96 MG/DL (ref 74–99)
INR PPP: 1.1 (ref 0.9–1.1)
POTASSIUM SERPL-SCNC: 4.2 MMOL/L (ref 3.5–5.3)
PROTHROMBIN TIME: 12.9 SECONDS (ref 9.8–12.8)
SODIUM SERPL-SCNC: 137 MMOL/L (ref 136–145)

## 2024-09-11 PROCEDURE — 85610 PROTHROMBIN TIME: CPT | Mod: OUT | Performed by: INTERNAL MEDICINE

## 2024-09-11 PROCEDURE — 80048 BASIC METABOLIC PNL TOTAL CA: CPT | Mod: OUT | Performed by: INTERNAL MEDICINE

## 2024-09-11 PROCEDURE — 36415 COLL VENOUS BLD VENIPUNCTURE: CPT | Mod: OUT | Performed by: INTERNAL MEDICINE

## 2024-09-13 ENCOUNTER — LAB REQUISITION (OUTPATIENT)
Dept: LAB | Facility: HOSPITAL | Age: 84
End: 2024-09-13
Payer: MEDICARE

## 2024-09-13 DIAGNOSIS — Z79.01 LONG TERM (CURRENT) USE OF ANTICOAGULANTS: ICD-10-CM

## 2024-09-13 DIAGNOSIS — G93.41 METABOLIC ENCEPHALOPATHY: ICD-10-CM

## 2024-09-13 LAB
AMMONIA PLAS-SCNC: 32 UMOL/L (ref 16–53)
INR PPP: 1 (ref 0.9–1.1)
PROTHROMBIN TIME: 11.2 SECONDS (ref 9.8–12.8)

## 2024-09-13 PROCEDURE — 36415 COLL VENOUS BLD VENIPUNCTURE: CPT | Mod: OUT | Performed by: INTERNAL MEDICINE

## 2024-09-13 PROCEDURE — 82140 ASSAY OF AMMONIA: CPT | Mod: OUT | Performed by: INTERNAL MEDICINE

## 2024-09-13 PROCEDURE — 85610 PROTHROMBIN TIME: CPT | Mod: OUT | Performed by: INTERNAL MEDICINE

## 2024-09-16 ENCOUNTER — LAB REQUISITION (OUTPATIENT)
Dept: LAB | Facility: HOSPITAL | Age: 84
End: 2024-09-16
Payer: MEDICARE

## 2024-09-16 DIAGNOSIS — I50.9 HEART FAILURE, UNSPECIFIED: ICD-10-CM

## 2024-09-16 DIAGNOSIS — G93.41 METABOLIC ENCEPHALOPATHY: ICD-10-CM

## 2024-09-16 DIAGNOSIS — I10 ESSENTIAL (PRIMARY) HYPERTENSION: ICD-10-CM

## 2024-09-16 LAB
INR PPP: 1.1 (ref 0.9–1.1)
PROTHROMBIN TIME: 12.6 SECONDS (ref 9.8–12.8)

## 2024-09-16 PROCEDURE — 36415 COLL VENOUS BLD VENIPUNCTURE: CPT | Mod: OUT | Performed by: INTERNAL MEDICINE

## 2024-09-16 PROCEDURE — 85610 PROTHROMBIN TIME: CPT | Mod: OUT | Performed by: INTERNAL MEDICINE

## 2024-09-18 ENCOUNTER — LAB REQUISITION (OUTPATIENT)
Dept: LAB | Facility: HOSPITAL | Age: 84
End: 2024-09-18
Payer: MEDICARE

## 2024-09-18 DIAGNOSIS — G93.41 METABOLIC ENCEPHALOPATHY: ICD-10-CM

## 2024-09-18 DIAGNOSIS — I10 ESSENTIAL (PRIMARY) HYPERTENSION: ICD-10-CM

## 2024-09-18 DIAGNOSIS — I50.9 HEART FAILURE, UNSPECIFIED: ICD-10-CM

## 2024-09-18 LAB
ANION GAP SERPL CALC-SCNC: 18 MMOL/L (ref 10–20)
BUN SERPL-MCNC: 29 MG/DL (ref 6–23)
CALCIUM SERPL-MCNC: 9.3 MG/DL (ref 8.6–10.3)
CHLORIDE SERPL-SCNC: 104 MMOL/L (ref 98–107)
CO2 SERPL-SCNC: 23 MMOL/L (ref 21–32)
CREAT SERPL-MCNC: 1.31 MG/DL (ref 0.5–1.3)
EGFRCR SERPLBLD CKD-EPI 2021: 53 ML/MIN/1.73M*2
ERYTHROCYTE [DISTWIDTH] IN BLOOD BY AUTOMATED COUNT: 14 % (ref 11.5–14.5)
GLUCOSE SERPL-MCNC: 136 MG/DL (ref 74–99)
HCT VFR BLD AUTO: 32.3 % (ref 41–52)
HGB BLD-MCNC: 9.9 G/DL (ref 13.5–17.5)
INR PPP: 1.3 (ref 0.9–1.1)
MCH RBC QN AUTO: 29.6 PG (ref 26–34)
MCHC RBC AUTO-ENTMCNC: 30.7 G/DL (ref 32–36)
MCV RBC AUTO: 96 FL (ref 80–100)
NRBC BLD-RTO: 0 /100 WBCS (ref 0–0)
PLATELET # BLD AUTO: 256 X10*3/UL (ref 150–450)
POTASSIUM SERPL-SCNC: 4.4 MMOL/L (ref 3.5–5.3)
PROTHROMBIN TIME: 14.6 SECONDS (ref 9.8–12.8)
RBC # BLD AUTO: 3.35 X10*6/UL (ref 4.5–5.9)
SODIUM SERPL-SCNC: 141 MMOL/L (ref 136–145)
WBC # BLD AUTO: 62.6 X10*3/UL (ref 4.4–11.3)

## 2024-09-18 PROCEDURE — 85610 PROTHROMBIN TIME: CPT | Mod: OUT | Performed by: INTERNAL MEDICINE

## 2024-09-18 PROCEDURE — 82374 ASSAY BLOOD CARBON DIOXIDE: CPT | Mod: OUT | Performed by: INTERNAL MEDICINE

## 2024-09-18 PROCEDURE — 36415 COLL VENOUS BLD VENIPUNCTURE: CPT | Mod: OUT | Performed by: INTERNAL MEDICINE

## 2024-09-18 PROCEDURE — 85027 COMPLETE CBC AUTOMATED: CPT | Mod: OUT | Performed by: INTERNAL MEDICINE

## 2024-09-18 PROCEDURE — 87086 URINE CULTURE/COLONY COUNT: CPT | Mod: OUT,GEALAB | Performed by: INTERNAL MEDICINE

## 2024-09-18 PROCEDURE — 3000000012 STAT CHARGE BILL (LAB USE ONLY): Mod: OUT | Performed by: INTERNAL MEDICINE

## 2024-09-18 PROCEDURE — 81001 URINALYSIS AUTO W/SCOPE: CPT | Mod: OUT | Performed by: INTERNAL MEDICINE

## 2024-09-19 ENCOUNTER — LAB REQUISITION (OUTPATIENT)
Dept: LAB | Facility: HOSPITAL | Age: 84
End: 2024-09-19
Payer: MEDICARE

## 2024-09-19 DIAGNOSIS — A41.9 SEPSIS, UNSPECIFIED ORGANISM (MULTI): ICD-10-CM

## 2024-09-19 LAB
APPEARANCE UR: CLEAR
BILIRUB UR STRIP.AUTO-MCNC: NEGATIVE MG/DL
CAOX CRY #/AREA UR COMP ASSIST: ABNORMAL /HPF
COLOR UR: YELLOW
GLUCOSE UR STRIP.AUTO-MCNC: NORMAL MG/DL
KETONES UR STRIP.AUTO-MCNC: NEGATIVE MG/DL
LEUKOCYTE ESTERASE UR QL STRIP.AUTO: ABNORMAL
MUCOUS THREADS #/AREA URNS AUTO: ABNORMAL /LPF
NITRITE UR QL STRIP.AUTO: NEGATIVE
PH UR STRIP.AUTO: 5.5 [PH]
PROT UR STRIP.AUTO-MCNC: ABNORMAL MG/DL
RBC # UR STRIP.AUTO: NEGATIVE /UL
RBC #/AREA URNS AUTO: ABNORMAL /HPF
SP GR UR STRIP.AUTO: 1.02
UROBILINOGEN UR STRIP.AUTO-MCNC: NORMAL MG/DL
WBC #/AREA URNS AUTO: ABNORMAL /HPF
WBC CLUMPS #/AREA URNS AUTO: ABNORMAL /HPF
YEAST BUDDING #/AREA UR COMP ASSIST: PRESENT /HPF

## 2024-09-19 PROCEDURE — 3000000012 STAT CHARGE BILL (LAB USE ONLY): Mod: OUT | Performed by: INTERNAL MEDICINE

## 2024-09-19 PROCEDURE — 36415 COLL VENOUS BLD VENIPUNCTURE: CPT | Mod: OUT | Performed by: INTERNAL MEDICINE

## 2024-09-19 PROCEDURE — 87040 BLOOD CULTURE FOR BACTERIA: CPT | Mod: OUT,GEALAB | Performed by: INTERNAL MEDICINE

## 2024-09-20 ENCOUNTER — LAB REQUISITION (OUTPATIENT)
Dept: LAB | Facility: HOSPITAL | Age: 84
End: 2024-09-20
Payer: MEDICARE

## 2024-09-20 DIAGNOSIS — Z79.01 LONG TERM (CURRENT) USE OF ANTICOAGULANTS: ICD-10-CM

## 2024-09-20 LAB
INR PPP: 1.8 (ref 0.9–1.1)
PROTHROMBIN TIME: 20.5 SECONDS (ref 9.8–12.8)

## 2024-09-20 PROCEDURE — 36415 COLL VENOUS BLD VENIPUNCTURE: CPT | Mod: OUT | Performed by: INTERNAL MEDICINE

## 2024-09-20 PROCEDURE — 85610 PROTHROMBIN TIME: CPT | Mod: OUT | Performed by: INTERNAL MEDICINE

## 2024-09-23 ENCOUNTER — LAB REQUISITION (OUTPATIENT)
Dept: LAB | Facility: HOSPITAL | Age: 84
End: 2024-09-23
Payer: MEDICARE

## 2024-09-23 DIAGNOSIS — Z79.01 LONG TERM (CURRENT) USE OF ANTICOAGULANTS: ICD-10-CM

## 2024-09-23 LAB
BACTERIA BLD CULT: NORMAL
BACTERIA BLD CULT: NORMAL
BACTERIA UR CULT: ABNORMAL
INR PPP: 2.6 (ref 0.9–1.1)
PROTHROMBIN TIME: 29.7 SECONDS (ref 9.8–12.8)

## 2024-09-23 PROCEDURE — 36415 COLL VENOUS BLD VENIPUNCTURE: CPT | Mod: OUT | Performed by: INTERNAL MEDICINE

## 2024-09-23 PROCEDURE — 85610 PROTHROMBIN TIME: CPT | Mod: OUT | Performed by: INTERNAL MEDICINE

## 2024-09-25 ENCOUNTER — LAB REQUISITION (OUTPATIENT)
Dept: LAB | Facility: HOSPITAL | Age: 84
End: 2024-09-25
Payer: MEDICARE

## 2024-09-25 DIAGNOSIS — I50.9 HEART FAILURE, UNSPECIFIED: ICD-10-CM

## 2024-09-25 DIAGNOSIS — I10 ESSENTIAL (PRIMARY) HYPERTENSION: ICD-10-CM

## 2024-09-25 LAB
INR PPP: 2.3 (ref 0.9–1.1)
PROTHROMBIN TIME: 26.2 SECONDS (ref 9.8–12.8)

## 2024-09-25 PROCEDURE — 36415 COLL VENOUS BLD VENIPUNCTURE: CPT | Mod: OUT | Performed by: INTERNAL MEDICINE

## 2024-09-25 PROCEDURE — 85610 PROTHROMBIN TIME: CPT | Mod: OUT | Performed by: INTERNAL MEDICINE

## 2024-09-27 ENCOUNTER — LAB REQUISITION (OUTPATIENT)
Dept: LAB | Facility: HOSPITAL | Age: 84
End: 2024-09-27
Payer: MEDICARE

## 2024-09-27 DIAGNOSIS — Z79.01 LONG TERM (CURRENT) USE OF ANTICOAGULANTS: ICD-10-CM

## 2024-09-27 LAB
INR PPP: 3.1 (ref 0.9–1.1)
PROTHROMBIN TIME: 35.9 SECONDS (ref 9.8–12.8)

## 2024-09-27 PROCEDURE — 36415 COLL VENOUS BLD VENIPUNCTURE: CPT | Mod: OUT | Performed by: INTERNAL MEDICINE

## 2024-09-27 PROCEDURE — 85610 PROTHROMBIN TIME: CPT | Mod: OUT | Performed by: INTERNAL MEDICINE

## 2024-09-30 ENCOUNTER — LAB REQUISITION (OUTPATIENT)
Dept: LAB | Facility: HOSPITAL | Age: 84
End: 2024-09-30
Payer: MEDICARE

## 2024-09-30 DIAGNOSIS — Z79.01 LONG TERM (CURRENT) USE OF ANTICOAGULANTS: ICD-10-CM

## 2024-09-30 LAB
INR PPP: 2 (ref 0.9–1.1)
PROTHROMBIN TIME: 23 SECONDS (ref 9.8–12.8)

## 2024-09-30 PROCEDURE — 85610 PROTHROMBIN TIME: CPT | Mod: OUT | Performed by: INTERNAL MEDICINE

## 2024-09-30 PROCEDURE — 36415 COLL VENOUS BLD VENIPUNCTURE: CPT | Mod: OUT | Performed by: INTERNAL MEDICINE

## 2024-10-02 ENCOUNTER — LAB REQUISITION (OUTPATIENT)
Dept: LAB | Facility: HOSPITAL | Age: 84
End: 2024-10-02
Payer: MEDICARE

## 2024-10-02 DIAGNOSIS — Z79.01 LONG TERM (CURRENT) USE OF ANTICOAGULANTS: ICD-10-CM

## 2024-10-02 LAB
INR PPP: 1.5 (ref 0.9–1.1)
PROTHROMBIN TIME: 17.1 SECONDS (ref 9.8–12.8)

## 2024-10-02 PROCEDURE — 36415 COLL VENOUS BLD VENIPUNCTURE: CPT | Mod: OUT | Performed by: INTERNAL MEDICINE

## 2024-10-02 PROCEDURE — 85610 PROTHROMBIN TIME: CPT | Mod: OUT | Performed by: INTERNAL MEDICINE

## 2024-10-04 ENCOUNTER — LAB REQUISITION (OUTPATIENT)
Dept: LAB | Facility: HOSPITAL | Age: 84
End: 2024-10-04
Payer: MEDICARE

## 2024-10-04 DIAGNOSIS — I10 ESSENTIAL (PRIMARY) HYPERTENSION: ICD-10-CM

## 2024-10-04 DIAGNOSIS — I50.9 HEART FAILURE, UNSPECIFIED: ICD-10-CM

## 2024-10-04 LAB
INR PPP: 2.2 (ref 0.9–1.1)
PROTHROMBIN TIME: 25.1 SECONDS (ref 9.8–12.8)

## 2024-10-04 PROCEDURE — 36415 COLL VENOUS BLD VENIPUNCTURE: CPT | Mod: OUT | Performed by: INTERNAL MEDICINE

## 2024-10-04 PROCEDURE — 85610 PROTHROMBIN TIME: CPT | Mod: OUT | Performed by: INTERNAL MEDICINE

## 2024-10-07 ENCOUNTER — LAB REQUISITION (OUTPATIENT)
Dept: LAB | Facility: HOSPITAL | Age: 84
End: 2024-10-07
Payer: MEDICARE

## 2024-10-07 DIAGNOSIS — I50.9 HEART FAILURE, UNSPECIFIED: ICD-10-CM

## 2024-10-07 DIAGNOSIS — I10 ESSENTIAL (PRIMARY) HYPERTENSION: ICD-10-CM

## 2024-10-07 LAB
INR PPP: 3.6 (ref 0.9–1.1)
PROTHROMBIN TIME: 41.2 SECONDS (ref 9.8–12.8)

## 2024-10-07 PROCEDURE — 85610 PROTHROMBIN TIME: CPT | Mod: OUT | Performed by: INTERNAL MEDICINE

## 2024-10-07 PROCEDURE — 36415 COLL VENOUS BLD VENIPUNCTURE: CPT | Mod: OUT | Performed by: INTERNAL MEDICINE

## 2024-10-09 ENCOUNTER — LAB REQUISITION (OUTPATIENT)
Dept: LAB | Facility: HOSPITAL | Age: 84
End: 2024-10-09
Payer: MEDICARE

## 2024-10-09 DIAGNOSIS — Z79.01 LONG TERM (CURRENT) USE OF ANTICOAGULANTS: ICD-10-CM

## 2024-10-09 LAB
INR PPP: 3.1 (ref 0.9–1.1)
PROTHROMBIN TIME: 35.3 SECONDS (ref 9.8–12.8)

## 2024-10-09 PROCEDURE — 36415 COLL VENOUS BLD VENIPUNCTURE: CPT | Mod: OUT | Performed by: INTERNAL MEDICINE

## 2024-10-09 PROCEDURE — 85610 PROTHROMBIN TIME: CPT | Mod: OUT | Performed by: INTERNAL MEDICINE

## 2024-10-11 ENCOUNTER — LAB REQUISITION (OUTPATIENT)
Dept: LAB | Facility: HOSPITAL | Age: 84
End: 2024-10-11
Payer: MEDICARE

## 2024-10-11 DIAGNOSIS — I48.91 UNSPECIFIED ATRIAL FIBRILLATION (MULTI): ICD-10-CM

## 2024-10-11 LAB
INR PPP: 3.2 (ref 0.9–1.1)
PROTHROMBIN TIME: 37.1 SECONDS (ref 9.8–12.8)

## 2024-10-11 PROCEDURE — 3000000012 STAT CHARGE BILL (LAB USE ONLY): Mod: OUT | Performed by: INTERNAL MEDICINE

## 2024-10-11 PROCEDURE — 85610 PROTHROMBIN TIME: CPT | Mod: OUT | Performed by: INTERNAL MEDICINE

## 2024-10-14 ENCOUNTER — LAB REQUISITION (OUTPATIENT)
Dept: LAB | Facility: HOSPITAL | Age: 84
End: 2024-10-14
Payer: MEDICARE

## 2024-10-14 DIAGNOSIS — Z79.01 LONG TERM (CURRENT) USE OF ANTICOAGULANTS: ICD-10-CM

## 2024-10-14 LAB
INR PPP: 3.8 (ref 0.9–1.1)
PROTHROMBIN TIME: 43.1 SECONDS (ref 9.8–12.8)

## 2024-10-14 PROCEDURE — 36415 COLL VENOUS BLD VENIPUNCTURE: CPT | Mod: OUT | Performed by: INTERNAL MEDICINE

## 2024-10-14 PROCEDURE — 85610 PROTHROMBIN TIME: CPT | Mod: OUT | Performed by: INTERNAL MEDICINE

## 2024-10-16 ENCOUNTER — LAB REQUISITION (OUTPATIENT)
Dept: LAB | Facility: HOSPITAL | Age: 84
End: 2024-10-16
Payer: MEDICARE

## 2024-10-16 DIAGNOSIS — Z79.01 LONG TERM (CURRENT) USE OF ANTICOAGULANTS: ICD-10-CM

## 2024-10-16 LAB
INR PPP: 2.1 (ref 0.9–1.1)
PROTHROMBIN TIME: 23.4 SECONDS (ref 9.8–12.8)

## 2024-10-16 PROCEDURE — 36415 COLL VENOUS BLD VENIPUNCTURE: CPT | Mod: OUT | Performed by: INTERNAL MEDICINE

## 2024-10-16 PROCEDURE — 85610 PROTHROMBIN TIME: CPT | Mod: OUT | Performed by: INTERNAL MEDICINE

## 2024-10-18 ENCOUNTER — LAB REQUISITION (OUTPATIENT)
Dept: LAB | Facility: HOSPITAL | Age: 84
End: 2024-10-18
Payer: MEDICARE

## 2024-10-18 DIAGNOSIS — Z79.01 LONG TERM (CURRENT) USE OF ANTICOAGULANTS: ICD-10-CM

## 2024-10-18 LAB
INR PPP: 1.6 (ref 0.9–1.1)
PROTHROMBIN TIME: 18.4 SECONDS (ref 9.8–12.8)

## 2024-10-18 PROCEDURE — 36415 COLL VENOUS BLD VENIPUNCTURE: CPT | Mod: OUT | Performed by: INTERNAL MEDICINE

## 2024-10-18 PROCEDURE — 85610 PROTHROMBIN TIME: CPT | Mod: OUT | Performed by: INTERNAL MEDICINE

## 2024-10-21 ENCOUNTER — LAB REQUISITION (OUTPATIENT)
Dept: LAB | Facility: HOSPITAL | Age: 84
End: 2024-10-21
Payer: MEDICARE

## 2024-10-21 DIAGNOSIS — G93.41 METABOLIC ENCEPHALOPATHY: ICD-10-CM

## 2024-10-21 LAB
INR PPP: 2 (ref 0.9–1.1)
PROTHROMBIN TIME: 22.6 SECONDS (ref 9.8–12.8)

## 2024-10-21 PROCEDURE — 85610 PROTHROMBIN TIME: CPT | Mod: OUT | Performed by: INTERNAL MEDICINE

## 2024-10-21 PROCEDURE — 36415 COLL VENOUS BLD VENIPUNCTURE: CPT | Mod: OUT | Performed by: INTERNAL MEDICINE
